# Patient Record
Sex: MALE
[De-identification: names, ages, dates, MRNs, and addresses within clinical notes are randomized per-mention and may not be internally consistent; named-entity substitution may affect disease eponyms.]

---

## 2019-03-07 PROBLEM — Z00.00 ENCOUNTER FOR PREVENTIVE HEALTH EXAMINATION: Status: ACTIVE | Noted: 2019-03-07

## 2019-03-15 ENCOUNTER — APPOINTMENT (OUTPATIENT)
Dept: GASTROENTEROLOGY | Facility: CLINIC | Age: 39
End: 2019-03-15
Payer: COMMERCIAL

## 2019-03-15 VITALS
TEMPERATURE: 98.6 F | BODY MASS INDEX: 52.48 KG/M2 | SYSTOLIC BLOOD PRESSURE: 148 MMHG | HEART RATE: 88 BPM | WEIGHT: 315 LBS | OXYGEN SATURATION: 97 % | HEIGHT: 65 IN | DIASTOLIC BLOOD PRESSURE: 98 MMHG

## 2019-03-15 PROCEDURE — 99213 OFFICE O/P EST LOW 20 MIN: CPT

## 2019-06-17 ENCOUNTER — APPOINTMENT (OUTPATIENT)
Dept: GASTROENTEROLOGY | Facility: CLINIC | Age: 39
End: 2019-06-17
Payer: COMMERCIAL

## 2019-06-17 ENCOUNTER — LABORATORY RESULT (OUTPATIENT)
Age: 39
End: 2019-06-17

## 2019-06-17 VITALS
HEART RATE: 86 BPM | WEIGHT: 160 LBS | DIASTOLIC BLOOD PRESSURE: 81 MMHG | SYSTOLIC BLOOD PRESSURE: 130 MMHG | OXYGEN SATURATION: 97 % | BODY MASS INDEX: 26.66 KG/M2 | HEIGHT: 65 IN | TEMPERATURE: 97 F

## 2019-06-17 DIAGNOSIS — Z83.3 FAMILY HISTORY OF DIABETES MELLITUS: ICD-10-CM

## 2019-06-17 DIAGNOSIS — Z78.9 OTHER SPECIFIED HEALTH STATUS: ICD-10-CM

## 2019-06-17 DIAGNOSIS — A04.8 OTHER SPECIFIED BACTERIAL INTESTINAL INFECTIONS: ICD-10-CM

## 2019-06-17 DIAGNOSIS — K64.8 OTHER HEMORRHOIDS: ICD-10-CM

## 2019-06-17 PROCEDURE — 99213 OFFICE O/P EST LOW 20 MIN: CPT

## 2019-06-17 RX ORDER — OMEPRAZOLE 40 MG/1
40 CAPSULE, DELAYED RELEASE ORAL
Refills: 0 | Status: ACTIVE | COMMUNITY

## 2019-06-17 NOTE — HISTORY OF PRESENT ILLNESS
[None] : had no significant interval events [Heartburn] : denies heartburn [Nausea] : denies nausea [Vomiting] : denies vomiting [Diarrhea] : denies diarrhea [Constipation] : denies constipation [Yellow Skin Or Eyes (Jaundice)] : denies jaundice [Abdominal Pain] : denies abdominal pain [Abdominal Swelling] : denies abdominal swelling [Rectal Pain] : denies rectal pain [Hiatus Hernia] : hiatus hernia [Wt Gain ___ Lbs] : no recent weight gain [Wt Loss ___ Lbs] : no recent weight loss [GERD] : no gastroesophageal reflux disease [Peptic Ulcer Disease] : no peptic ulcer disease [Pancreatitis] : no pancreatitis [Cholelithiasis] : no cholelithiasis [Kidney Stone] : no kidney stone [Inflammatory Bowel Disease] : no inflammatory bowel disease [Irritable Bowel Syndrome] : no irritable bowel syndrome [Diverticulitis] : no diverticulitis [Alcohol Abuse] : no alcohol abuse [Malignancy] : no malignancy [Abdominal Surgery] : no abdominal surgery [Cholecystectomy] : no cholecystectomy [Appendectomy] : no appendectomy [de-identified] : The patient denies any prior exposure to hepatitis A, B or C.  The patient denies any large doses of nonsteroidal anti-inflammatory drugs or acetaminophen.   The patient denies sharing needles, razors, nail clippers, nail files, scissors, et cetera.  The patient denies any EtOH abuse, cocaine use or intravenous drug use.   The patient denies any prior surgery, blood transfusions, sexual indiscretions, tattoos or piercing.  The patient admits to having prior surgery.   The patient denies any family history of GI or liver problems.  The patient admits to a family history of GI and liver problems.  The patient states that he is feeling uncomfortable x several months. The patient denies any jaundice or pruritus.  The patient complains of chronic left flank and left sided lower back pain.   The patient denies any abdominal pain.  The patient denies any abdominal gas and bloating.  The patient denies any nausea or vomiting.  The patient denies any gastroesophageal reflux disease or dysphagia.  The patient any atypical chest pain, shortness of breath or palpitations.  The patient denies any diaphoresis. The patient denies any constipation or diarrhea.  The patient has 1 to 2 bowel movements a day.  The patient denies a change in bowel habits.  The patient denies a change in caliber of stool.  The patient denies having mucus discharge with the bowel movements.  The patient denies any bright red blood per rectum, melena or hematemesis.  The patient denies any rectal pain or rectal pruritus.  The patient denies any weight loss or anorexia.  He denies any fevers or chills.  The upper endoscopy performed in the office on February 12, 2019 revealed small hiatal hernia and mild diffuse bile gastritis.  The pathology revealed distal esophagus with unremarkable squamous mucosa with no columnar-type mucosa identified, gastric antral and body mucosa with chronic active gastritis that was positive for Helicobacter pylori and unremarkable small bowel mucosa with no evidence of celiac disease or parasites.  The patient completed the trial of antibiotic for the H. pylori eradication.  The colonoscopy to the terminal ileum performed at the office on January 29, 2019 revealed mild patchy nonspecific erythema in the cecum and sigmoid colon, a long and tortuous colon, a poor prep colonoscopy and small internal hemorrhoids. The pathology revealed unremarkable small bowel mucosa (terminal ilium) and unremarkable colonic mucosa (cecum, sigmoid colon and rectum).  The patient tolerated the procedure well.  The CT-guided liver biopsy performed on January 31, 2014 at [facility] revealed mild macrovesicular steatosis, occasional sites with ballooning degeneration and mild necroinflammation of zone 3.  No portal inflammation was present.  The trichrome stain revealed mild perivenular and focal tiffanie-sinusoidal fibrosis.  The iron stain was negative.  The findings were consistent with steatohepatitis with mild activity (grade 1 of 3) and zone 3 tiffanie-sinusoidal fibrosis (stage 1 of 4).  The MRI of the kidneys performed on June 28, 2010 revealed a right renal lesion seen by sonography but is very likely a cyst containing debris, with an MRI equivalent of Bosniak category 2F cyst (most likely benign) anti-hepatic cysts.   The abdominal ultrasound performed on November 30, 2013 revealed echogenic liver suggestive of fatty infiltration versus intrinsic hepatocellular disease that is stable right upper pole renal angiomyolipoma. The CAT scan of the abdomen and pelvis performed on December 11, 2010 revealed previously identified benign appearing cyst on MRI in the mid pole of the right kidney but is not changed for decreased in size from previous (measuring approximately 8 mm).  It is less well evaluated on this study compared to the MRI.  The stability from previous exam is most consistent with a benign etiology.  Also noted was fatty infiltration of liver.  The blood work performed on November 25, 2013 was significant for an elevated ALT level of 75 U/L and an elevated PT/INR of 12.7/1.2.   The blood work revealed elevated liver enzymes (alkaline phosphatase/ALT of 128/75 units/L, respectively), and an elevated anti-smooth muscle antibody of 1:40 and gliadin IgA antibody of 52 units. The patient tolerated the procedures. The patient denies any significant family history of GI problems.

## 2019-06-17 NOTE — ASSESSMENT
[FreeTextEntry1] : Internal Hemorrhoids: The patient is to consider a trial of Anusol H. C. suppositories one per rectum nightly and Anusol HC2 .5% cream apply to affected area twice a day p.r.n. hemorrhoidal bleeding or pain.\par Hiatal Hernia:  The patient was advised to avoid late-night meals and dietary indiscretions.  The patient was advised to avoid fried and fatty foods.  The patient was advised to abide by an anti-GERD diet. The patient was given a pamphlet for anti-GERD.  The patient and I reviewed the anti-GERD diet at length.\par Gastritis: The patient has a history of gastritis. The patient is to avoid nonsteroidal anti-inflammatory drugs and aspirin. I recommend a trial of pantoprazole 40 mg once a day for 3 months for the symptoms.\par Fatty Liver:  The patient had an imaging study suggestive of fatty infiltration of the liver.  The patient denies any jaundice or pruritus.  The patient denies any alcohol use.  The patient denies taking large doses of nonsteroidal anti-inflammatory drugs or acetaminophen.  The findings are suggestive of fatty liver.  The patient and I had a long discussion regarding the risks of fatty liver and possible progression to cirrhosis.  The patient was told of the possible increased risk of developing liver failure, cirrhosis, ascites, GI bleeding secondary to varices, hepatic encephalopathy, bleeding tendencies and liver cancer.  The patient was told of the importance of follow-up.  The patient was advised to follow up every 6 months for blood work and imaging studies. The patient agreed and will follow up. The patient was advised to lose weight. I recommend a trial of vitamin E supplementation for the fatty liver.  If the liver enzymes remain elevated, the patient may require a trial of Pioglitazone for the fatty liver. I recommend avoid alcohol and hepato-toxic agents.  The patient was also advised to avoid NSAIDs, Acetaminophen and any other hepatotoxic drugs. The patient was also advised not to share needles, razors, scissors, nail clippers, etc.. The patient is to continue close follow-up in our office for blood work and exams.  If the liver enzymes remain elevated, the patient may require a CT guided liver biopsy to assess the liver parenchyma for possible treatment.  We had a long discussion regarding the risks and benefits of the procedure.  The patient was told of the risks of bleeding, perforation, infections, emergency surgery and missing lesions.  The patient agreed and will follow-up to reassess the symptoms.  I recommend a CBC, SMA 24, amylase, lipase, ESR, TFTs, HILARY, rheumatoid factor, celiac sprue panel, IgA, profile for hepatitis A, B, C. ,AFP, alpha 1 anti-trypsin  antibody, ceruloplasmin level, iron, TIBC, ferritin level, AMA, anti smooth muscle antibody and PT/INR/PTT.  I recommend an abdominal ultrasound of the liver to assess the liver parenchyma and for liver lesions.\par Back Pain;  The etiology of the back and left flank pain is unclear.  The patient denies any urinary tract symptoms.  He denies any polyuria, nocturia, dysuria, urinary incontinence, urinary urgency.  He denies any history of kidney stones.  I recommend an abdominal ultrasound to assess for kidney stones.  I also recommend followup with his PMD regarding the left flank and back pain. \par Blood Work: I recommend blood work to assess the patient`s symptoms. I recommend a CBC, SMA 24, amylase, lipase, ESR, TFTs, HILARY, rheumatoid factor, celiac sprue panel, IgA, profile for hepatitis A, B, C. ,AFP, alpha 1 anti-trypsin  antibody, ceruloplasmin level, iron, TIBC, ferritin level, AMA, anti smooth muscle antibody and PT/INR/PTT. \par Blood work: The patient had blood work performed by his PMD. I will try to obtain the blood work results. \par The patient had an imaging study performed by the PMD.  I will try to obtain the results of the imaging study. \par Imaging Study: I recommend an imaging study to assess the symptoms. I recommend an abdominal ultrasound to assess the liver parenchyma and for liver lesions. \par The patient had a prior poor prep colonoscopy. I recommend a repeat colonoscopy in <  3 year to reassess for colonic polyps secondary to the poor prep unless symptomatic.  The patient agreed and will follow up for the procedure. \par Follow-up:  The patient is to follow-up in the office in 3 months to reassess the symptoms.   The patient was told to call the office if any further problems.\par

## 2019-06-18 ENCOUNTER — MESSAGE (OUTPATIENT)
Age: 39
End: 2019-06-18

## 2019-06-18 LAB
A1AT SERPL-MCNC: 110 MG/DL
AFP-TM SERPL-MCNC: 7.9 NG/ML
ALBUMIN SERPL ELPH-MCNC: 4.3 G/DL
ALP BLD-CCNC: 99 U/L
ALT SERPL-CCNC: 21 U/L
AMYLASE/CREAT SERPL: 59 U/L
ANA SER IF-ACNC: NEGATIVE
ANION GAP SERPL CALC-SCNC: 11 MMOL/L
APTT BLD: 32 SEC
AST SERPL-CCNC: 17 U/L
BASOPHILS # BLD AUTO: 0.03 K/UL
BASOPHILS NFR BLD AUTO: 0.5 %
BILIRUB SERPL-MCNC: 0.4 MG/DL
BUN SERPL-MCNC: 17 MG/DL
CALCIUM SERPL-MCNC: 8.7 MG/DL
CHLORIDE SERPL-SCNC: 105 MMOL/L
CO2 SERPL-SCNC: 24 MMOL/L
CREAT SERPL-MCNC: 0.59 MG/DL
EOSINOPHIL # BLD AUTO: 0.17 K/UL
EOSINOPHIL NFR BLD AUTO: 2.6 %
ERYTHROCYTE [SEDIMENTATION RATE] IN BLOOD BY WESTERGREN METHOD: 14 MM/HR
FERRITIN SERPL-MCNC: 161 NG/ML
GGT SERPL-CCNC: 21 U/L
GLUCOSE SERPL-MCNC: 98 MG/DL
HAV IGM SER QL: NONREACTIVE
HBV CORE IGM SER QL: NONREACTIVE
HBV SURFACE AG SER QL: NONREACTIVE
HCT VFR BLD CALC: 45.3 %
HCV AB SER QL: NONREACTIVE
HCV S/CO RATIO: 0.06 S/CO
HEPATITIS A IGG ANTIBODY: NONREACTIVE
HGB BLD-MCNC: 14.3 G/DL
IGA SER QL IEP: 139 MG/DL
IMM GRANULOCYTES NFR BLD AUTO: 0.3 %
INR PPP: 1.09 RATIO
IRON SATN MFR SERPL: 34 %
IRON SERPL-MCNC: 111 UG/DL
LPL SERPL-CCNC: 21 U/L
LYMPHOCYTES # BLD AUTO: 1.35 K/UL
LYMPHOCYTES NFR BLD AUTO: 20.3 %
MAN DIFF?: NORMAL
MCHC RBC-ENTMCNC: 30 PG
MCHC RBC-ENTMCNC: 31.6 GM/DL
MCV RBC AUTO: 95 FL
MITOCHONDRIA AB SER IF-ACNC: NORMAL
MONOCYTES # BLD AUTO: 0.49 K/UL
MONOCYTES NFR BLD AUTO: 7.4 %
NEUTROPHILS # BLD AUTO: 4.59 K/UL
NEUTROPHILS NFR BLD AUTO: 68.9 %
PLATELET # BLD AUTO: 319 K/UL
POTASSIUM SERPL-SCNC: 4 MMOL/L
PROT SERPL-MCNC: 7 G/DL
PT BLD: 12.3 SEC
RBC # BLD: 4.77 M/UL
RBC # FLD: 12.9 %
RHEUMATOID FACT SER QL: <10 IU/ML
SMOOTH MUSCLE AB SER QL IF: ABNORMAL
SODIUM SERPL-SCNC: 140 MMOL/L
TIBC SERPL-MCNC: 327 UG/DL
TSH SERPL-ACNC: 2.11 UIU/ML
UIBC SERPL-MCNC: 216 UG/DL
UREA BREATH TEST QL: NEGATIVE
WBC # FLD AUTO: 6.65 K/UL

## 2019-06-19 LAB
HBV E AB SER QL: NEGATIVE
HBV E AG SER QL: NEGATIVE

## 2019-06-20 ENCOUNTER — MESSAGE (OUTPATIENT)
Age: 39
End: 2019-06-20

## 2019-06-24 LAB
CELIAC DISEASE INTERPRETATION: NORMAL
CELIAC GENE PAIRS PRESENT: NO
DQ ALPHA 1: NORMAL
DQ BETA 1: NORMAL
IMMUNOGLOBULIN A (IGA): 165 MG/DL

## 2019-07-24 ENCOUNTER — MESSAGE (OUTPATIENT)
Age: 39
End: 2019-07-24

## 2019-09-06 ENCOUNTER — APPOINTMENT (OUTPATIENT)
Dept: GASTROENTEROLOGY | Facility: CLINIC | Age: 39
End: 2019-09-06
Payer: COMMERCIAL

## 2019-09-06 VITALS
OXYGEN SATURATION: 98 % | WEIGHT: 160 LBS | TEMPERATURE: 97.5 F | SYSTOLIC BLOOD PRESSURE: 118 MMHG | BODY MASS INDEX: 26.66 KG/M2 | DIASTOLIC BLOOD PRESSURE: 77 MMHG | HEART RATE: 82 BPM | HEIGHT: 65 IN

## 2019-09-06 DIAGNOSIS — K76.0 FATTY (CHANGE OF) LIVER, NOT ELSEWHERE CLASSIFIED: ICD-10-CM

## 2019-09-06 DIAGNOSIS — K44.9 DIAPHRAGMATIC HERNIA W/OUT OBSTRUCTION OR GANGRENE: ICD-10-CM

## 2019-09-06 DIAGNOSIS — R10.12 LEFT UPPER QUADRANT PAIN: ICD-10-CM

## 2019-09-06 DIAGNOSIS — R10.9 UNSPECIFIED ABDOMINAL PAIN: ICD-10-CM

## 2019-09-06 DIAGNOSIS — K29.30 CHRONIC SUPERFICIAL GASTRITIS W/OUT BLEEDING: ICD-10-CM

## 2019-09-06 PROCEDURE — 99213 OFFICE O/P EST LOW 20 MIN: CPT

## 2019-09-06 RX ORDER — DICYCLOMINE HYDROCHLORIDE 10 MG/1
10 CAPSULE ORAL 3 TIMES DAILY
Qty: 90 | Refills: 3 | Status: ACTIVE | COMMUNITY
Start: 2019-09-06 | End: 1900-01-01

## 2019-09-06 NOTE — ASSESSMENT
[FreeTextEntry1] : Abdominal Pain: The patient complains of abdominal pain. The patient is to avoid nonsteroidal anti-inflammatory drugs and aspirin.  I recommend a low FOD-MAP diet.  I recommend a trial of Dicyclomine 10 mg tablet PO 3 times a day PRN for the abdominal pain.\par Internal Hemorrhoids: The patient is to consider a trial of Anusol H. C. suppositories one per rectum nightly and Anusol HC2.5% cream apply to affected area twice a day p.r.n. hemorrhoidal bleeding or pain.\par Hiatal Hernia: The patient was advised to avoid late-night meals and dietary indiscretions. The patient was advised to avoid fried and fatty foods. The patient was advised to abide by an anti-GERD diet. The patient was given a pamphlet for anti-GERD. The patient and I reviewed the anti-GERD diet at length.\par Gastritis: The patient has a history of gastritis. The patient is to avoid nonsteroidal anti-inflammatory drugs and aspirin. I recommend a trial of pantoprazole 40 mg once a day for 3 months for the symptoms.\par Fatty Liver: The patient had a prior imaging study suggestive of fatty infiltration of the liver. The patient denies any jaundice or pruritus. The patient denies any alcohol use. The patient denies taking large doses of nonsteroidal anti-inflammatory drugs or acetaminophen. The findings were suggestive of fatty liver. The patient and I had a long discussion regarding the risks of fatty liver and possible progression to cirrhosis. The patient was told of the possible increased risk of developing liver failure, cirrhosis, ascites, GI bleeding secondary to varices, hepatic encephalopathy, bleeding tendencies and liver cancer. The patient was told of the importance of follow-up. The patient was advised to follow up every 6 months for blood work and imaging studies. The patient agreed and will follow up. The patient was advised to lose weight. I recommend a trial of vitamin E supplementation for the fatty liver. If the liver enzymes remain elevated, the patient may require a trial of Pioglitazone for the fatty liver. I recommend avoid alcohol and hepato-toxic agents. The patient was also advised to avoid NSAIDs, Acetaminophen and any other hepatotoxic drugs. The patient was also advised not to share needles, razors, scissors, nail clippers, etc.. The patient is to continue close follow-up in our office for blood work and exams. If the liver enzymes remain elevated, the patient may require a CT guided liver biopsy to assess the liver parenchyma for possible treatment. We had a long discussion regarding the risks and benefits of the procedure. The patient was told of the risks of bleeding, perforation, infections, emergency surgery and missing lesions. The patient agreed and will follow-up to reassess the symptoms. I recommend a repeat abdominal ultrasound of the liver to assess the liver parenchyma and for liver lesions in 6 months.\par Left Flank Pain; The etiology of the left flank pain is unclear. The patient denies any urinary tract symptoms. He denies any polyuria, nocturia, dysuria, urinary incontinence, urinary urgency. He denies any history of kidney stones. I reviewed the abdominal ultrasound and CAT scan of the abdomen and pelvis that confirmed kidney stones. I also recommend followup with his PMD and urologist regarding the left flank and back pain. \par Blood work: The patient had blood work performed by his PMD. I reviewed the blood work results. \par The patient had an imaging study performed by the PMD. I reviewed the results of the imaging study (CAT scan). \par The patient had a prior poor prep colonoscopy. I recommend a repeat colonoscopy in < 3 year to reassess for colonic polyps secondary to the poor prep unless symptomatic. The patient agreed and will follow up for the procedure. \par Follow-up: The patient is to follow-up in the office in 6 months to reassess the symptoms. The patient was told to call the office if any further problems.\par

## 2019-09-06 NOTE — HISTORY OF PRESENT ILLNESS
[None] : had no significant interval events [Heartburn] : denies heartburn [Vomiting] : denies vomiting [Nausea] : denies nausea [Constipation] : denies constipation [Diarrhea] : denies diarrhea [Abdominal Swelling] : denies abdominal swelling [Yellow Skin Or Eyes (Jaundice)] : denies jaundice [Rectal Pain] : denies rectal pain [Abdominal Pain] : abdominal pain [Kidney Stone] : kidney stone [Wt Gain ___ Lbs] : no recent weight gain [Wt Loss ___ Lbs] : no recent weight loss [Hiatus Hernia] : no hiatus hernia [GERD] : no gastroesophageal reflux disease [Peptic Ulcer Disease] : no peptic ulcer disease [Pancreatitis] : no pancreatitis [Cholelithiasis] : no cholelithiasis [Inflammatory Bowel Disease] : no inflammatory bowel disease [Irritable Bowel Syndrome] : no irritable bowel syndrome [Diverticulitis] : no diverticulitis [Alcohol Abuse] : no alcohol abuse [Malignancy] : no malignancy [Abdominal Surgery] : no abdominal surgery [Appendectomy] : no appendectomy [Cholecystectomy] : no cholecystectomy [de-identified] : The patient denies any prior exposure to hepatitis A, B or C. The patient denies any large doses of nonsteroidal anti-inflammatory drugs or acetaminophen. The patient denies sharing needles, razors, nail clippers, nail files, scissors, et cetera. The patient denies any EtOH abuse, cocaine use or intravenous drug use. The patient denies any prior surgery, blood transfusions, sexual indiscretions, tattoos or piercing. The patient admits to having prior surgery. The patient denies any family history of GI or liver problems.   The patient states that he is feeling the same.  The patient denies any jaundice or pruritus.  The patient complains of occasional lower back pain. The patient complains of abdominal pain.  The patient describes the abdominal pain as a crampy, intermittent left sided abdominal discomfort that radiates to the left flank and umbilicus.  The abdominal pain is unrelated to meals or passing gas or having bowel movements.  The abdominal pain is worse with bending movement.  The abdominal pain is described as being mild in nature.  The abdominal pain occurs at night and in the morning.  The abdominal pain can occur at any time.   The abdominal pain has never awakened the patient from sleep. The abdominal pain is not relieved with medication.  The patient denies any abdominal gas and bloating.  The patient denies any nausea or vomiting.  The patient denies any gastroesophageal reflux disease or dysphagia.  The patient denies any atypical chest pain, shortness of breath or palpitations.  The patient denies any diaphoresis. The patient denies any constipation or diarrhea.  The patient has 1 to 2 bowel movements a day.   The patient denies a change in bowel habits.  The patient denies a change in caliber of stool.  The patient denies having mucus discharge with the bowel movements.  The patient denies any bright red blood per rectum, melena or hematemesis.  The patient denies any rectal pain or rectal pruritus.  The patient denies any weight loss or anorexia.  He denies any fevers or chills.   The patient had a CAT scan of the abdomen and pelvis with IV contrast performed on August 6, 2019.  The CAT scan of the abdomen and pelvis with IV contrast revealed 2 left renal calculi measuring 2 mm each and  a small umbilical hernia.  The patient was evaluated by urologist, Dr. Johnnie Mcclelland.  The patient was told to observe the renal stones at this time.  The patient had an abdominal ultrasound performed on July 23, 2019. The abdominal ultrasound revealed an unremarkable study.  The blood tests performed on June 17, 2019 was significant for an elevated anti-smooth muscle antibody of 1:20. The upper endoscopy performed in the office on February 12, 2019 revealed small hiatal hernia and mild diffuse bile gastritis. The pathology revealed distal esophagus with unremarkable squamous mucosa with no columnar-type mucosa identified, gastric antral and body mucosa with chronic active gastritis that was positive for Helicobacter pylori and unremarkable small bowel mucosa with no evidence of celiac disease or parasites. The patient completed the trial of antibiotic for the H. pylori eradication. The colonoscopy to the terminal ileum performed at the office on January 29, 2019 revealed mild patchy nonspecific erythema in the cecum and sigmoid colon, a long and tortuous colon, a poor prep colonoscopy and small internal hemorrhoids. The pathology revealed unremarkable small bowel mucosa (terminal ilium) and unremarkable colonic mucosa (cecum, sigmoid colon and rectum). The patient tolerated the procedure well. The CT-guided liver biopsy performed on January 31, 2014 at [facility] revealed mild macrovesicular steatosis, occasional sites with ballooning degeneration and mild necroinflammation of zone 3. No portal inflammation was present. The trichrome stain revealed mild perivenular and focal tiffanie-sinusoidal fibrosis. The iron stain was negative. The findings were consistent with steatohepatitis with mild activity (grade 1 of 3) and zone 3 tiffanie-sinusoidal fibrosis (stage 1 of 4). The MRI of the kidneys performed on June 28, 2010 revealed a right renal lesion seen by sonography but is very likely a cyst containing debris, with an MRI equivalent of Bosniak category 2F cyst (most likely benign) anti-hepatic cysts. The abdominal ultrasound performed on November 30, 2013 revealed echogenic liver suggestive of fatty infiltration versus intrinsic hepatocellular disease that is stable right upper pole renal angiomyolipoma. The CAT scan of the abdomen and pelvis performed on December 11, 2010 revealed previously identified benign appearing cyst on MRI in the mid pole of the right kidney but is not changed for decreased in size from previous (measuring approximately 8 mm). It is less well evaluated on this study compared to the MRI. The stability from previous exam is most consistent with a benign etiology. Also noted was fatty infiltration of liver. The blood work performed on November 25, 2013 was significant for an elevated ALT level of 75 U/L and an elevated PT/INR of 12.7/1.2. The blood work revealed elevated liver enzymes (alkaline phosphatase/ALT of 128/75 units/L, respectively), and an elevated anti-smooth muscle antibody of 1:40 and gliadin IgA antibody of 52 units. The patient tolerated the procedures. The patient denies any significant family history of GI problems.

## 2020-03-06 ENCOUNTER — APPOINTMENT (OUTPATIENT)
Dept: GASTROENTEROLOGY | Facility: CLINIC | Age: 40
End: 2020-03-06

## 2022-12-23 ENCOUNTER — INPATIENT (INPATIENT)
Facility: HOSPITAL | Age: 42
LOS: 5 days | Discharge: PSYCHIATRIC OR SUBSTANCE USE FACILITY | DRG: 880 | End: 2022-12-29
Attending: STUDENT IN AN ORGANIZED HEALTH CARE EDUCATION/TRAINING PROGRAM | Admitting: STUDENT IN AN ORGANIZED HEALTH CARE EDUCATION/TRAINING PROGRAM
Payer: COMMERCIAL

## 2022-12-23 VITALS
DIASTOLIC BLOOD PRESSURE: 89 MMHG | OXYGEN SATURATION: 99 % | RESPIRATION RATE: 18 BRPM | SYSTOLIC BLOOD PRESSURE: 151 MMHG | TEMPERATURE: 99 F | HEART RATE: 150 BPM

## 2022-12-23 DIAGNOSIS — R50.9 FEVER, UNSPECIFIED: ICD-10-CM

## 2022-12-23 DIAGNOSIS — F33.2 MAJOR DEPRESSIVE DISORDER, RECURRENT SEVERE WITHOUT PSYCHOTIC FEATURES: ICD-10-CM

## 2022-12-23 LAB
ALBUMIN SERPL ELPH-MCNC: 4.5 G/DL — SIGNIFICANT CHANGE UP (ref 3.3–5)
ALP SERPL-CCNC: 121 U/L — HIGH (ref 40–120)
ALT FLD-CCNC: 37 U/L — SIGNIFICANT CHANGE UP (ref 10–45)
AMPHET UR-MCNC: NEGATIVE — SIGNIFICANT CHANGE UP
ANION GAP SERPL CALC-SCNC: 12 MMOL/L — SIGNIFICANT CHANGE UP (ref 5–17)
APAP SERPL-MCNC: <15 UG/ML — SIGNIFICANT CHANGE UP (ref 10–30)
APPEARANCE UR: ABNORMAL
APTT BLD: 26.4 SEC — LOW (ref 27.5–35.5)
AST SERPL-CCNC: 22 U/L — SIGNIFICANT CHANGE UP (ref 10–40)
BACTERIA # UR AUTO: NEGATIVE — SIGNIFICANT CHANGE UP
BARBITURATES UR SCN-MCNC: NEGATIVE — SIGNIFICANT CHANGE UP
BASE EXCESS BLDV CALC-SCNC: -0.4 MMOL/L — SIGNIFICANT CHANGE UP (ref -2–3)
BASE EXCESS BLDV CALC-SCNC: 1.5 MMOL/L — SIGNIFICANT CHANGE UP (ref -2–3)
BASOPHILS # BLD AUTO: 0.02 K/UL — SIGNIFICANT CHANGE UP (ref 0–0.2)
BASOPHILS NFR BLD AUTO: 0.2 % — SIGNIFICANT CHANGE UP (ref 0–2)
BENZODIAZ UR-MCNC: POSITIVE
BILIRUB SERPL-MCNC: 0.3 MG/DL — SIGNIFICANT CHANGE UP (ref 0.2–1.2)
BILIRUB UR-MCNC: NEGATIVE — SIGNIFICANT CHANGE UP
BUN SERPL-MCNC: 11 MG/DL — SIGNIFICANT CHANGE UP (ref 7–23)
CA-I SERPL-SCNC: 1.11 MMOL/L — LOW (ref 1.15–1.33)
CA-I SERPL-SCNC: 1.13 MMOL/L — LOW (ref 1.15–1.33)
CALCIUM SERPL-MCNC: 9 MG/DL — SIGNIFICANT CHANGE UP (ref 8.4–10.5)
CHLORIDE BLDV-SCNC: 104 MMOL/L — SIGNIFICANT CHANGE UP (ref 96–108)
CHLORIDE BLDV-SCNC: 107 MMOL/L — SIGNIFICANT CHANGE UP (ref 96–108)
CHLORIDE SERPL-SCNC: 104 MMOL/L — SIGNIFICANT CHANGE UP (ref 96–108)
CO2 BLDV-SCNC: 26 MMOL/L — SIGNIFICANT CHANGE UP (ref 22–26)
CO2 BLDV-SCNC: 28 MMOL/L — HIGH (ref 22–26)
CO2 SERPL-SCNC: 22 MMOL/L — SIGNIFICANT CHANGE UP (ref 22–31)
COCAINE METAB.OTHER UR-MCNC: NEGATIVE — SIGNIFICANT CHANGE UP
COLOR SPEC: YELLOW — SIGNIFICANT CHANGE UP
CREAT SERPL-MCNC: 0.63 MG/DL — SIGNIFICANT CHANGE UP (ref 0.5–1.3)
DIFF PNL FLD: ABNORMAL
EGFR: 123 ML/MIN/1.73M2 — SIGNIFICANT CHANGE UP
EOSINOPHIL # BLD AUTO: 0.05 K/UL — SIGNIFICANT CHANGE UP (ref 0–0.5)
EOSINOPHIL NFR BLD AUTO: 0.5 % — SIGNIFICANT CHANGE UP (ref 0–6)
EPI CELLS # UR: 1 /HPF — SIGNIFICANT CHANGE UP
ETHANOL SERPL-MCNC: <10 MG/DL — SIGNIFICANT CHANGE UP (ref 0–10)
FLUAV AG NPH QL: SIGNIFICANT CHANGE UP
FLUBV AG NPH QL: SIGNIFICANT CHANGE UP
GAS PNL BLDV: 139 MMOL/L — SIGNIFICANT CHANGE UP (ref 136–145)
GAS PNL BLDV: SIGNIFICANT CHANGE UP
GLUCOSE BLDV-MCNC: 103 MG/DL — HIGH (ref 70–99)
GLUCOSE BLDV-MCNC: 126 MG/DL — HIGH (ref 70–99)
GLUCOSE SERPL-MCNC: 134 MG/DL — HIGH (ref 70–99)
GLUCOSE UR QL: NEGATIVE — SIGNIFICANT CHANGE UP
HCO3 BLDV-SCNC: 25 MMOL/L — SIGNIFICANT CHANGE UP (ref 22–29)
HCO3 BLDV-SCNC: 27 MMOL/L — SIGNIFICANT CHANGE UP (ref 22–29)
HCT VFR BLD CALC: 44.8 % — SIGNIFICANT CHANGE UP (ref 39–50)
HCT VFR BLDA CALC: 39 % — SIGNIFICANT CHANGE UP (ref 39–51)
HCT VFR BLDA CALC: 41 % — SIGNIFICANT CHANGE UP (ref 39–51)
HGB BLD CALC-MCNC: 13.1 G/DL — SIGNIFICANT CHANGE UP (ref 12.6–17.4)
HGB BLD CALC-MCNC: 13.5 G/DL — SIGNIFICANT CHANGE UP (ref 12.6–17.4)
HGB BLD-MCNC: 14.4 G/DL — SIGNIFICANT CHANGE UP (ref 13–17)
HYALINE CASTS # UR AUTO: 7 /LPF — HIGH (ref 0–2)
IMM GRANULOCYTES NFR BLD AUTO: 0.6 % — SIGNIFICANT CHANGE UP (ref 0–0.9)
INR BLD: 1.31 RATIO — HIGH (ref 0.88–1.16)
KETONES UR-MCNC: ABNORMAL
LACTATE BLDV-MCNC: 2.1 MMOL/L — HIGH (ref 0.5–2)
LACTATE BLDV-MCNC: 2.4 MMOL/L — HIGH (ref 0.5–2)
LEUKOCYTE ESTERASE UR-ACNC: NEGATIVE — SIGNIFICANT CHANGE UP
LYMPHOCYTES # BLD AUTO: 0.5 K/UL — LOW (ref 1–3.3)
LYMPHOCYTES # BLD AUTO: 4.5 % — LOW (ref 13–44)
MCHC RBC-ENTMCNC: 29.6 PG — SIGNIFICANT CHANGE UP (ref 27–34)
MCHC RBC-ENTMCNC: 32.1 GM/DL — SIGNIFICANT CHANGE UP (ref 32–36)
MCV RBC AUTO: 92.2 FL — SIGNIFICANT CHANGE UP (ref 80–100)
METHADONE UR-MCNC: NEGATIVE — SIGNIFICANT CHANGE UP
MONOCYTES # BLD AUTO: 0.61 K/UL — SIGNIFICANT CHANGE UP (ref 0–0.9)
MONOCYTES NFR BLD AUTO: 5.5 % — SIGNIFICANT CHANGE UP (ref 2–14)
NEUTROPHILS # BLD AUTO: 9.79 K/UL — HIGH (ref 1.8–7.4)
NEUTROPHILS NFR BLD AUTO: 88.7 % — HIGH (ref 43–77)
NITRITE UR-MCNC: NEGATIVE — SIGNIFICANT CHANGE UP
NRBC # BLD: 0 /100 WBCS — SIGNIFICANT CHANGE UP (ref 0–0)
OPIATES UR-MCNC: NEGATIVE — SIGNIFICANT CHANGE UP
OTHER CELLS CSF MANUAL: 13.8 ML/DL — LOW (ref 18–22)
OXYCODONE UR-MCNC: NEGATIVE — SIGNIFICANT CHANGE UP
PCO2 BLDV: 42 MMHG — SIGNIFICANT CHANGE UP (ref 42–55)
PCO2 BLDV: 43 MMHG — SIGNIFICANT CHANGE UP (ref 42–55)
PCP SPEC-MCNC: SIGNIFICANT CHANGE UP
PCP UR-MCNC: NEGATIVE — SIGNIFICANT CHANGE UP
PH BLDV: 7.38 — SIGNIFICANT CHANGE UP (ref 7.32–7.43)
PH BLDV: 7.4 — SIGNIFICANT CHANGE UP (ref 7.32–7.43)
PH UR: 6.5 — SIGNIFICANT CHANGE UP (ref 5–8)
PLATELET # BLD AUTO: 352 K/UL — SIGNIFICANT CHANGE UP (ref 150–400)
PO2 BLDV: 42 MMHG — SIGNIFICANT CHANGE UP (ref 25–45)
PO2 BLDV: 44 MMHG — SIGNIFICANT CHANGE UP (ref 25–45)
POTASSIUM BLDV-SCNC: 3.5 MMOL/L — SIGNIFICANT CHANGE UP (ref 3.5–5.1)
POTASSIUM BLDV-SCNC: 3.6 MMOL/L — SIGNIFICANT CHANGE UP (ref 3.5–5.1)
POTASSIUM SERPL-MCNC: 3.5 MMOL/L — SIGNIFICANT CHANGE UP (ref 3.5–5.3)
POTASSIUM SERPL-SCNC: 3.5 MMOL/L — SIGNIFICANT CHANGE UP (ref 3.5–5.3)
PROT SERPL-MCNC: 7.4 G/DL — SIGNIFICANT CHANGE UP (ref 6–8.3)
PROT UR-MCNC: ABNORMAL
PROTHROM AB SERPL-ACNC: 15.2 SEC — HIGH (ref 10.5–13.4)
RAPID RVP RESULT: SIGNIFICANT CHANGE UP
RBC # BLD: 4.86 M/UL — SIGNIFICANT CHANGE UP (ref 4.2–5.8)
RBC # FLD: 13.1 % — SIGNIFICANT CHANGE UP (ref 10.3–14.5)
RBC CASTS # UR COMP ASSIST: 5 /HPF — HIGH (ref 0–4)
RSV RNA NPH QL NAA+NON-PROBE: SIGNIFICANT CHANGE UP
SALICYLATES SERPL-MCNC: <2 MG/DL — LOW (ref 15–30)
SAO2 % BLDV: 74.2 % — SIGNIFICANT CHANGE UP (ref 67–88)
SAO2 % BLDV: 74.7 % — SIGNIFICANT CHANGE UP (ref 67–88)
SARS-COV-2 RNA SPEC QL NAA+PROBE: SIGNIFICANT CHANGE UP
SODIUM SERPL-SCNC: 138 MMOL/L — SIGNIFICANT CHANGE UP (ref 135–145)
SP GR SPEC: 1.03 — HIGH (ref 1.01–1.02)
T3 SERPL-MCNC: 113 NG/DL — SIGNIFICANT CHANGE UP (ref 80–200)
T4 AB SER-ACNC: 6.6 UG/DL — SIGNIFICANT CHANGE UP (ref 4.6–12)
THC UR QL: NEGATIVE — SIGNIFICANT CHANGE UP
TROPONIN T, HIGH SENSITIVITY RESULT: <6 NG/L — SIGNIFICANT CHANGE UP (ref 0–51)
TSH SERPL-MCNC: 1.09 UIU/ML — SIGNIFICANT CHANGE UP (ref 0.27–4.2)
UROBILINOGEN FLD QL: ABNORMAL
WBC # BLD: 11.04 K/UL — HIGH (ref 3.8–10.5)
WBC # FLD AUTO: 11.04 K/UL — HIGH (ref 3.8–10.5)
WBC UR QL: 3 /HPF — SIGNIFICANT CHANGE UP (ref 0–5)

## 2022-12-23 PROCEDURE — 93308 TTE F-UP OR LMTD: CPT | Mod: 26

## 2022-12-23 PROCEDURE — 99285 EMERGENCY DEPT VISIT HI MDM: CPT

## 2022-12-23 PROCEDURE — 93010 ELECTROCARDIOGRAM REPORT: CPT

## 2022-12-23 PROCEDURE — 71045 X-RAY EXAM CHEST 1 VIEW: CPT | Mod: 26

## 2022-12-23 RX ORDER — SODIUM CHLORIDE 9 MG/ML
1000 INJECTION, SOLUTION INTRAVENOUS ONCE
Refills: 0 | Status: COMPLETED | OUTPATIENT
Start: 2022-12-23 | End: 2022-12-23

## 2022-12-23 RX ORDER — SODIUM CHLORIDE 9 MG/ML
1000 INJECTION INTRAMUSCULAR; INTRAVENOUS; SUBCUTANEOUS ONCE
Refills: 0 | Status: COMPLETED | OUTPATIENT
Start: 2022-12-23 | End: 2022-12-23

## 2022-12-23 RX ORDER — ACETAMINOPHEN 500 MG
975 TABLET ORAL ONCE
Refills: 0 | Status: COMPLETED | OUTPATIENT
Start: 2022-12-23 | End: 2022-12-23

## 2022-12-23 RX ADMIN — Medication 1 MILLIGRAM(S): at 14:06

## 2022-12-23 RX ADMIN — Medication 975 MILLIGRAM(S): at 15:34

## 2022-12-23 RX ADMIN — SODIUM CHLORIDE 1000 MILLILITER(S): 9 INJECTION INTRAMUSCULAR; INTRAVENOUS; SUBCUTANEOUS at 11:21

## 2022-12-23 RX ADMIN — SODIUM CHLORIDE 1000 MILLILITER(S): 9 INJECTION, SOLUTION INTRAVENOUS at 15:42

## 2022-12-23 RX ADMIN — SODIUM CHLORIDE 1000 MILLILITER(S): 9 INJECTION, SOLUTION INTRAVENOUS at 13:18

## 2022-12-23 RX ADMIN — Medication 1 MILLIGRAM(S): at 11:33

## 2022-12-23 NOTE — ED BEHAVIORAL HEALTH ASSESSMENT NOTE - PSYCHIATRIC ISSUES AND PLAN (INCLUDE STANDING AND PRN MEDICATION)
Continue with meds: Venlafaxine 75mg, Clonazepam 1mg, Olanzapine 5mg at bedtime, and Lunesta 3mg at bedtime, observe 1:1

## 2022-12-23 NOTE — ED ADULT TRIAGE NOTE - CCCP TRG CHIEF CMPLNT
Found [place] pacing back and forth looking into the water - Hx depression/anxiety - denies thinking about jumping but was not found near his vehicle/depression

## 2022-12-23 NOTE — ED PROVIDER NOTE - PROGRESS NOTE DETAILS
Orion Bedolla, PGY-3- collateral from wife outside of room. no hx of alcohol use or withdrawal. per wife, pt made a comment a few weeks ago about jumping off the bridge but she didn't think he would try to act on it. Attending Jody Abreu: pt spiked a temperature. on exam pt tachycardic.,abdomen soft and nontender. pt denies any neck pain and no meningismus. no known h/o thyroid disease. will give tyelnol, initial flu negative O'Marianela DO PGY-3: received sign out on this patient. Attending Jody Abreu: HR improved to 112. pt feeling well O'Marianela DO PGY-3: will admit to hospitalist as pt is febrile w/o known source O'Marianela DO PGY-3: pt well appearing. Does not meet sepsis criteria. Possible viral source and therefore did not antibiose in ED. TBA to medicine for further follow up of cultures

## 2022-12-23 NOTE — ED PROVIDER NOTE - OBJECTIVE STATEMENT
Orion Bedolla, PGY-3- 42 year old male with a pmhx of depression, anxiety, 1 prior suicide attempt (9/2021), is brought to ED by PD for evaluation of pacing on [place]. Pt reports he was driving a car and stopped because his check tire light came on. He got out of car to fix tire and reports police showed up next. Per police, no vehicle was near pt on bridge. Pt reports he feels nervous now. Denies any attempt to kill himself. Reports no increase in sadness. Reports no active SI or plan for SI. No hallucinations, homicidal ideations. No fever, chest pain, shortness of breath, vomiting, diarrhea, trauma, thyroid disease. Denies tobacco, EtOH, or recreational drug use. NKDA. Lives at home with wife and two children. 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 Daily meds include Venlafaxine, Clonazepam, Seroquel, Lunesta.   Psych - Dr. Telly De La aPz

## 2022-12-23 NOTE — ED ADULT NURSE NOTE - OBJECTIVE STATEMENT
42y male with hx of anxiety and depression bibems for concern of suicide attempt. Pt is alert and oriented x 4 and speaking coherently, calm affect and cooperative with staff. As per EMS  called due to concern for pt looking over bridge and passerbys became concerned for pt safety. upon arrival to ER pt states that he was looking at his car tire, and states he was not attempting to harm himself. Collateral with EMS confirmed pt did not have a car this morning. pt states he has no suicidal ideations at this time, and does not have a plan to harm himself. pt states he has attempted suicide in the past by trying to put a belt around his neck. pt states he feels safe at home and denies any alcohol or drug use. pt noted to be tachycardic to the 150s, pt placed on CM EKG completed. IV placed in RAC. Pt denies feeling cp, sob, palps, n/v/d, fevers, chills. pt in nad. resting comfortably. pt on 1:1 for safety.

## 2022-12-23 NOTE — ED BEHAVIORAL HEALTH ASSESSMENT NOTE - RISK ASSESSMENT
Pt is currently denying SI/HI/AVH, however, recent history and story inconsistencies along with prior SA attempts is rather concerning for risk to self. Pt would benefit from inpatient psychiatric admission at this time.

## 2022-12-23 NOTE — ED ADULT NURSE NOTE - NS_BELOGIGINS_SECURE_ED_ALL_ED
"    ----------------------------------------------------------------------------------------------------------  [facility], [place]   Psychiatric Progress Note  Hospital Day #1     Interim History:   The patient's care was discussed with the treatment team and chart notes were reviewed.    Sleep 7 hours (07/09/21 0622)  Scheduled Medications: Compliant with scheduled medications.  PRN medications: Trazodone 50mg PO, hydroxyzine 25mg PO    Staff Report:   \"Pt isolative to room almost entire shift, laying in bed majority of shift. Pt reports trying to catch up on sleep since they had been up for several days before admission. Pt was appropriate and polite. Pt does not appear to be responding. Pt endorses depression 8/10 and anxiety 8/10. Pt denies SI/SIB thoughts. Patient is medication compliant and reported no side effects. No PRN medications given this shift.   Hygiene is adequate.   Pt is receiving double portions and ate almost all of meal.\"     Patient Interview:   Perry says that he's \"really sad and depressed\" today, feeling \"hopeless\" and \"discouraged\". He says that his sleep was \"ok\" and \"had a lot of nightmares\" that disrupted his sleep. He says that the nightmares have been occurring since he relapsed. He reports no auditory hallucinations today. He has noticed muscle cramps since yesterday. He says that his appetite has been good. He says that he has been feeling anxious and is currently anxious. He reports suicidal ideations with a plan during his relapse period. He is still interested in going to a CD treatment center. He says that his sister has been his support system and he spoke with her yesterday. He says that at their worst, his AH consist of discouraging statements. He reports lower back pain. He had not taken his medications for the 4 days prior to his admission. He also took a half dozen Percocet in addition to amphetamine use during that period. He was offered a " "mattress topper, but declined at this time.     The risks, benefits, alternatives and side effects of any medication changes have been discussed and are understood by the patient and other caregivers.    Review of systems:     ROS was negative unless noted above.          Allergies:     Allergies   Allergen Reactions     Wellbutrin [Bupropion] Anaphylaxis and Swelling     Facial swelling            Psychiatric Examination:   /73   Pulse 64   Temp 97.5  F (36.4  C)   Resp 14   Ht 1.905 m (6' 3\")   Wt 99.8 kg (220 lb)   SpO2 94%   BMI 27.50 kg/m    Weight is 220 lbs 0 oz  Body mass index is 27.5 kg/m .    MENTAL STATUS EXAM    Appearance:  awake, alert, dressed in hospital scrubs, appeared as age stated and cooperative, visible tattoos on neck,arms/forearms dorsum of hands/fingers  Attitude:  cooperative  Eye Contact:  fair  Mood:  Depressed, anxious   Affect:  appropriate and in normal range, mood congruent and intensity is normal  Speech:  clear, coherent  Psychomotor Behavior:  no evidence of tardive dyskinesia, dystonia, or tics  Thought Process:  logical, linear and goal oriented  Associations:  no loose associations  Thought Content:  thoughts of self-harm, which are decreased and auditory hallucinations present  Insight: fair  Judgment: poor  Oriented to:  time, person, and place  Attention Span and Concentration:  intact  Recent and Remote Memory: Grossly intact  Language:  english with appropriate syntax and vocabulary  Fund of Knowledge: normal, fair  Muscle Strength and Tone: not assessed  Gait and Station: Normal           Labs:   No results found for this or any previous visit (from the past 24 hour(s)).  Other tests: EKG, sinus bradycardia (42BPM) QT/QTc: 478/399   Assessment  & Plan      Assessment:     Perry Preciado is a 46 year old male with a history of schizophrenia, MDD and polysubstance abuse admitted from the [place] ED to unit 22 on 7/8/2021. Significant symptoms include AH, " paranoid delusions, SI, depressed and substance use. The patient presented to the ED 5 days after relapsing on methamphetamine and medication non-adherence with AH and paranoid delusions. Upon presentation, he was given Zyprexa and transferred to unit 22.  He has a history of multiple hospitalizations for MDD and substance abuse and his last psychiatric hospitalization was in 5/4/2020 for SI. The patient is currently prescribed Seroquel, Suboxone, Gabapentin and Celexa. Given his recent meth relapse and history of AH and depression, the most likely diagnosis is substance induced psychosis.Differential diagnoses include schizophrenia and bipolar disorder.     Given that he currently has SI and psychosis, patient warrants inpatient psychiatric hospitalization to maintain his safety. Disposition pending clinical stabilization, medication optimization and development of an appropriate discharge plan.     Risk for harm is moderate-high.  Risk factors: SI, maladaptive coping, substance use, impulsive and past behaviors  Protective factors: family and engaged in treatment      Psychiatric Hospital course:   Perry Preciado was admitted to [facility] on 7/8/21 on a 72 hour hold but signed in as a volentary patient on arrival. The patient was transferred from [facility] ED in [place] where he was given zyprexa and stabilized. Upon admission to unit 22 on 7/8, he was prescribed Abilify 5 mg/day for psychosis and Suboxone 12/12 mg and Gabapentin 600 mg PO 3x day were continued. Celexa was discontinued due to ineffectiveness. On the evening of 7/8, he required PRN Trazodone 50mg and hydroxyzine 25mg for insomnia and anxiety. On 7/9, he denied any AH so Abilify was continued at 5 mg, however he endorsed back pain so gabapentin was increased to 900 mg TID.       Principal psychiatric diagnosis:      # Substance Induced Psychosis  -Suboxone 4-1 mg per film. 3 film, sublingual BID  -CD treatment upon discharge      Secondary  diagnoses:      # Schizophrenia  - Abilify PO 5mg Daily/AM  - Gabapentin  mg TID  -Hold Celexa     PRN Medications:   - Acetaminophen 650 mg po Q4hrs PRN pain  - Mylanta 30 ml Q4H PRN for indigestion  - Hydroxyzine 25 mg Q4hrs PRN   - Olanzapine 10 mg PO/IM TID PRN severe agitation/psychosis  - Trazodone 50 mg PRN at bedtime         PRN  Medications  - Hydroxyzine 25 mg Q4hrs PRN   - Olanzapine 10 mg PO/IM TID PRN severe agitation/psychosis  - Trazodone 50 mg PRN at bedtime or Melatonin 3mg po at bedtime PRN    - Acetaminophen 650 mg po Q4hrs PRN pain     Legal Status:   Orders Placed This Encounter      Legal status Voluntary      Safety Assessment:   Behavioral Orders   Procedures     Code 1 - Restrict to Unit     Elopement precautions     Routine Programming     As clinically indicated     Self Injury Precaution     Single Room     Status 15     Every 15 minutes.     Suicide precautions     Patients on Suicide Precautions should have a Combination Diet ordered that includes a Diet selection(s) AND a Behavioral Tray selection for Safe Tray - with utensils, or Safe Tray - NO utensils       Withdrawal precautions       Disposition: Pending stabilization, plan to discharge to CD program.     Patient will be treated in therapeutic milieu with appropriate individual and group therapies as described.  The patient was seen and the plan was discussed with the attending physician.     Cristian Viera, MS3    Kulwant Kohler MD  Psychiatry PGY-1 Resident   Pager:  [phone]     Resident Attestation:   I was present with the medical student who participated in the service and in the documentation of the note. I have verified the history and personally performed the exam and medical decision making. I agree with the assessment and plan of care as documented in the note.    Attestation:  I, Pb Arciniega MD, have personally performed an examination of this patient and I have reviewed the resident's documentation.  I  have edited the note to reflect all relevant changes.  I have discussed this patient with the house staff on 7/9/2021.  I agree with resident findings and plan in today's note and yesterdays resident H&P.  I have reviewed all vitals and laboratory findings.      I certifiy that the inpatient services were ordered in accordance with the Medicare regulations governing the order. This includes certification that hospital inpatient services are reasonable and necessary and in the case of services not specified as inpatient-only under 42 .22(n), that they are appropriately provided as inpatient services in accordance with the 2-midnight benchmark under 42 .3(e).     The reason for inpatient status is Acute Psychosis.    Pb Arciniega M.D.,Ph.D.       Secured with [facility] security

## 2022-12-23 NOTE — ED BEHAVIORAL HEALTH ASSESSMENT NOTE - HPI (INCLUDE ILLNESS QUALITY, SEVERITY, DURATION, TIMING, CONTEXT, MODIFYING FACTORS, ASSOCIATED SIGNS AND SYMPTOMS)
Pt is a 41 y/o ,  male, currently domiciled with his wife, 2 children, and mother-in-law, employed as an . Pt was previously hospitalized at [facility] in September 2021 for a SA, currently seeing a psychiatrist, Dr. Telly De La Paz, on Venlafaxine 75mg, Clonazepam 1mg, Olanzapine, and Lunesta 3mg, diagnosed with MDD. No PMHx, no forensic hx, no substance use disorder hx. BIB EMS for potential suicide attempt. Per chart, the police called EMS after pt was seen by passerby looking over the [place] and became concerned for his safety. Psychiatry consulted for SI.     Pt is A&Ox4. Pt denies looking over the bridge saying that he was on the bridge because his tire light went on and he got out on the bridge to check it. Per chart, collateral with EMS confirmed pt did not have a car this morning and no car was noted by police. Pt states he has had a few changes in his psychiatric medication recently and Effexor was added 2-3 weeks ago. Pt denies current SI/HI/AVH. He endorses depression, anhedonia, lack of appetite, sleep disturbances, and anxiety.     Per pt's wife, he called her 2-3 weeks ago as he was driving over a bridge near his work, wanting to jump off of it. His wife was able to convince him not to do it and he continued to drive home. She says he continues to be depressed despite being on various medications. She reports trying everything she can to help him such as yoga, hypnotherapy, homeopathic medicine, with no improvement. She says "he used to enjoy life" but now is not interested in doing many of the things he used to enjoy.     Although pt is denying current SI and denies that being on the bridge was a SA, his recount of events is not in line with EMS or the police as he did not have a car present at the scene.

## 2022-12-23 NOTE — ED ADULT TRIAGE NOTE - CHIEF COMPLAINT QUOTE
Found [place] pacing back and forth looking into the water - Hx depression/anxiety - denies thinking about jumping but was not found near his vehicle

## 2022-12-23 NOTE — ED BEHAVIORAL HEALTH ASSESSMENT NOTE - SUMMARY
Pt is a 41 y/o ,  male, currently domiciled with his wife, 2 children, and mother-in-law, employed as an . Pt was previously hospitalized at [facility] in September 2021 for a SA, currently seeing a psychiatrist, Dr. Telly De La Paz, on Venlafaxine 75mg, Clonazepam 1mg, Olanzapine, and Lunesta 3mg, diagnosed with MDD. No PMHx, no forensic hx, no substance use disorder hx. BIB EMS for potential suicide attempt. Per chart, the police called EMS after pt was seen by passerby looking over the [place] and became concerned for his safety. Psychiatry consulted for SI.      Plan:  -Continue 1:1  -Recommend potential inpatient psychiatric admission once cleared medically

## 2022-12-23 NOTE — ED PROVIDER NOTE - CLINICAL SUMMARY MEDICAL DECISION MAKING FREE TEXT BOX
Orion Bedolla, PGY-3- 42 year old male hx of anxiety, depression, 1 prior SI attempt, here after [place] found [place]. Pt reporting he was fixing a tire though no vehicle was seen near patient on bridge. Pt tachycardic and nervous appearing on arrival. Afebrile rectally. Unclear etiology of pt's behavior. Plan for psych eval. Will work up toxic metabolic derangement, substance use, though less likely. Pt will likely need admission

## 2022-12-23 NOTE — ED PROVIDER NOTE - ATTENDING CONTRIBUTION TO CARE
Attending Statement (NINI Augustin MD):    HPI: 43y/o M with h/o depression/anxiety, 1 prior suicide attempt (9/2021), BIBEMS after found [place]; per police report no car nearby and appeared to bystanders as if he was looking to jump; per patient states he was fixing a tire (but unable to tell where car is now). Denies active SI, denies hi, ah, or vh. Denies alcohol, tobacco or drug use.    Review of Systems:  -All else negative unless otherwise specified elsewhere in this note.    PSH/PMH as noted above    On Physical Exam:  General: anxious but in NAD, speaking clearly in full sentences and without difficulty; cooperative with exam  HEENT: PERRL, MMM  Neck: no neck tenderness, no nuchal rigidity  Cardiac: tachycardic (140bpm) regular  Lungs: CTABL  Abdomen: soft nontender/nondistended  : no bladder tenderness or distension  Skin: intact, no rash  Extremities: no peripheral edema, no gross deformities  Neuro: no gross neurologic deficits; no faical asymmtry, moving all extremities equally, no reported regions of sensation loss in extremities, no rigidity or clonus    MDM: Attending Statement (NINI Augustin MD):    HPI: 41y/o M with h/o depression/anxiety, 1 prior suicide attempt (9/2021), BIBEMS after found [place]; per police report no car nearby and appeared to bystanders as if he was looking to jump; per patient states he was fixing a tire (but unable to tell where car is now). Denies active SI, denies hi, ah, or vh. Denies alcohol, tobacco or drug use.    Review of Systems:  -All else negative unless otherwise specified elsewhere in this note.    PSH/PMH as noted above    On Physical Exam:  General: anxious but in NAD, speaking clearly in full sentences and without difficulty; cooperative with exam  HEENT: PERRL, MMM  Neck: no neck tenderness, no nuchal rigidity  Cardiac: tachycardic (140bpm) regular  Lungs: CTABL  Abdomen: soft nontender/nondistended  : no bladder tenderness or distension  Skin: intact, no rash  Extremities: no peripheral edema, no gross deformities  Neuro: no gross neurologic deficits; no facial asymmetry, moving all extremities equally, no reported regions of sensation loss in extremities, no rigidity or clonus    MDM: 42M h/o depression/anxiety, presenting with suicidality / possible suicide attempt; will need psych clearance labs including serum tox; obtain psych consult for disposition.

## 2022-12-23 NOTE — ED PROVIDER NOTE - PHYSICAL EXAMINATION
General: well appearing, appears nervous, AOx3  Skin: no rash, no pallor  Head: normocephalic, atraumatic  Eyes: clear conjunctiva, EOMI, PERRL   ENMT: airway patent, no nasal discharge  Cardiovascular: tachycardic, normal rhythm, S1/S2  Pulmonary: clear to auscultation bilaterally, no rales, rhonchi, or wheeze  Abdomen: soft, nontender  Musculoskeletal: moving extremities well, no deformity  Neuro: CN II-XII grossly intact, 5/5 strength extremities, speech clear   Psych: normal mood, flat affect

## 2022-12-23 NOTE — ED BEHAVIORAL HEALTH ASSESSMENT NOTE - NSBHATTESTCOMMENTATTENDFT_PSY_A_CORE
This is a 42-y.o. HM patient, , currently domiciled with his wife, 2 children, and mother-in-law, employed as an , brought in by [facility] after found at [facility], contemplating suicide. Consult requested to evaluate for suicidality.    I have seen and evaluated this patient myself. Chart, labs, meds reviewed. I agree with trainee's assessment and plan. Patient is not a safe discharge and warrants further inpatient evaluation and care when medically stable. 2PC furnished.

## 2022-12-24 ENCOUNTER — TRANSCRIPTION ENCOUNTER (OUTPATIENT)
Age: 42
End: 2022-12-24

## 2022-12-24 DIAGNOSIS — Z29.9 ENCOUNTER FOR PROPHYLACTIC MEASURES, UNSPECIFIED: ICD-10-CM

## 2022-12-24 DIAGNOSIS — R00.0 TACHYCARDIA, UNSPECIFIED: ICD-10-CM

## 2022-12-24 DIAGNOSIS — R65.10 SYSTEMIC INFLAMMATORY RESPONSE SYNDROME (SIRS) OF NON-INFECTIOUS ORIGIN WITHOUT ACUTE ORGAN DYSFUNCTION: ICD-10-CM

## 2022-12-24 LAB
ALBUMIN SERPL ELPH-MCNC: 4.2 G/DL — SIGNIFICANT CHANGE UP (ref 3.3–5)
ALP SERPL-CCNC: 108 U/L — SIGNIFICANT CHANGE UP (ref 40–120)
ALT FLD-CCNC: 40 U/L — SIGNIFICANT CHANGE UP (ref 10–45)
ANION GAP SERPL CALC-SCNC: 12 MMOL/L — SIGNIFICANT CHANGE UP (ref 5–17)
AST SERPL-CCNC: 24 U/L — SIGNIFICANT CHANGE UP (ref 10–40)
BILIRUB SERPL-MCNC: 0.2 MG/DL — SIGNIFICANT CHANGE UP (ref 0.2–1.2)
BUN SERPL-MCNC: 7 MG/DL — SIGNIFICANT CHANGE UP (ref 7–23)
CALCIUM SERPL-MCNC: 9.2 MG/DL — SIGNIFICANT CHANGE UP (ref 8.4–10.5)
CHLORIDE SERPL-SCNC: 106 MMOL/L — SIGNIFICANT CHANGE UP (ref 96–108)
CO2 SERPL-SCNC: 24 MMOL/L — SIGNIFICANT CHANGE UP (ref 22–31)
CREAT SERPL-MCNC: 0.57 MG/DL — SIGNIFICANT CHANGE UP (ref 0.5–1.3)
EGFR: 126 ML/MIN/1.73M2 — SIGNIFICANT CHANGE UP
GLUCOSE SERPL-MCNC: 120 MG/DL — HIGH (ref 70–99)
HCT VFR BLD CALC: 43.2 % — SIGNIFICANT CHANGE UP (ref 39–50)
HGB BLD-MCNC: 13.8 G/DL — SIGNIFICANT CHANGE UP (ref 13–17)
LACTATE SERPL-SCNC: 1.6 MMOL/L — SIGNIFICANT CHANGE UP (ref 0.5–2)
MAGNESIUM SERPL-MCNC: 2.1 MG/DL — SIGNIFICANT CHANGE UP (ref 1.6–2.6)
MCHC RBC-ENTMCNC: 29.9 PG — SIGNIFICANT CHANGE UP (ref 27–34)
MCHC RBC-ENTMCNC: 31.9 GM/DL — LOW (ref 32–36)
MCV RBC AUTO: 93.5 FL — SIGNIFICANT CHANGE UP (ref 80–100)
NRBC # BLD: 0 /100 WBCS — SIGNIFICANT CHANGE UP (ref 0–0)
PHOSPHATE SERPL-MCNC: 2.2 MG/DL — LOW (ref 2.5–4.5)
PLATELET # BLD AUTO: 320 K/UL — SIGNIFICANT CHANGE UP (ref 150–400)
POTASSIUM SERPL-MCNC: 3.9 MMOL/L — SIGNIFICANT CHANGE UP (ref 3.5–5.3)
POTASSIUM SERPL-SCNC: 3.9 MMOL/L — SIGNIFICANT CHANGE UP (ref 3.5–5.3)
PROT SERPL-MCNC: 7.2 G/DL — SIGNIFICANT CHANGE UP (ref 6–8.3)
RBC # BLD: 4.62 M/UL — SIGNIFICANT CHANGE UP (ref 4.2–5.8)
RBC # FLD: 13.2 % — SIGNIFICANT CHANGE UP (ref 10.3–14.5)
SODIUM SERPL-SCNC: 142 MMOL/L — SIGNIFICANT CHANGE UP (ref 135–145)
WBC # BLD: 6.65 K/UL — SIGNIFICANT CHANGE UP (ref 3.8–10.5)
WBC # FLD AUTO: 6.65 K/UL — SIGNIFICANT CHANGE UP (ref 3.8–10.5)

## 2022-12-24 PROCEDURE — 99223 1ST HOSP IP/OBS HIGH 75: CPT

## 2022-12-24 PROCEDURE — 12345: CPT | Mod: NC,GC

## 2022-12-24 PROCEDURE — 93010 ELECTROCARDIOGRAM REPORT: CPT

## 2022-12-24 PROCEDURE — 99232 SBSQ HOSP IP/OBS MODERATE 35: CPT

## 2022-12-24 RX ORDER — ZOLPIDEM TARTRATE 10 MG/1
5 TABLET ORAL AT BEDTIME
Refills: 0 | Status: DISCONTINUED | OUTPATIENT
Start: 2022-12-24 | End: 2022-12-29

## 2022-12-24 RX ORDER — VENLAFAXINE HCL 75 MG
112.5 CAPSULE, EXT RELEASE 24 HR ORAL DAILY
Refills: 0 | Status: DISCONTINUED | OUTPATIENT
Start: 2022-12-24 | End: 2022-12-26

## 2022-12-24 RX ORDER — VENLAFAXINE HCL 75 MG
75 CAPSULE, EXT RELEASE 24 HR ORAL DAILY
Refills: 0 | Status: DISCONTINUED | OUTPATIENT
Start: 2022-12-24 | End: 2022-12-24

## 2022-12-24 RX ORDER — ENOXAPARIN SODIUM 100 MG/ML
40 INJECTION SUBCUTANEOUS EVERY 24 HOURS
Refills: 0 | Status: DISCONTINUED | OUTPATIENT
Start: 2022-12-24 | End: 2022-12-28

## 2022-12-24 RX ORDER — OLANZAPINE 15 MG/1
5 TABLET, FILM COATED ORAL AT BEDTIME
Refills: 0 | Status: DISCONTINUED | OUTPATIENT
Start: 2022-12-24 | End: 2022-12-29

## 2022-12-24 RX ORDER — CLONAZEPAM 1 MG
1 TABLET ORAL THREE TIMES A DAY
Refills: 0 | Status: DISCONTINUED | OUTPATIENT
Start: 2022-12-24 | End: 2022-12-25

## 2022-12-24 RX ORDER — CHLORHEXIDINE GLUCONATE 213 G/1000ML
1 SOLUTION TOPICAL DAILY
Refills: 0 | Status: DISCONTINUED | OUTPATIENT
Start: 2022-12-24 | End: 2022-12-29

## 2022-12-24 RX ORDER — OLANZAPINE 15 MG/1
5 TABLET, FILM COATED ORAL
Refills: 0 | Status: DISCONTINUED | OUTPATIENT
Start: 2022-12-24 | End: 2022-12-24

## 2022-12-24 RX ORDER — LANOLIN ALCOHOL/MO/W.PET/CERES
3 CREAM (GRAM) TOPICAL AT BEDTIME
Refills: 0 | Status: DISCONTINUED | OUTPATIENT
Start: 2022-12-24 | End: 2022-12-29

## 2022-12-24 RX ADMIN — Medication 3 MILLIGRAM(S): at 22:34

## 2022-12-24 RX ADMIN — ENOXAPARIN SODIUM 40 MILLIGRAM(S): 100 INJECTION SUBCUTANEOUS at 12:37

## 2022-12-24 RX ADMIN — Medication 1 MILLIGRAM(S): at 20:39

## 2022-12-24 RX ADMIN — OLANZAPINE 5 MILLIGRAM(S): 15 TABLET, FILM COATED ORAL at 22:34

## 2022-12-24 RX ADMIN — CHLORHEXIDINE GLUCONATE 1 APPLICATION(S): 213 SOLUTION TOPICAL at 12:32

## 2022-12-24 RX ADMIN — OLANZAPINE 5 MILLIGRAM(S): 15 TABLET, FILM COATED ORAL at 05:41

## 2022-12-24 RX ADMIN — Medication 112.5 MILLIGRAM(S): at 12:32

## 2022-12-24 RX ADMIN — Medication 1 MILLIGRAM(S): at 10:21

## 2022-12-24 NOTE — PATIENT PROFILE ADULT - FALL HARM RISK - HARM RISK INTERVENTIONS

## 2022-12-24 NOTE — PATIENT PROFILE ADULT - FALL HARM RISK - PATIENT NEEDS ASSISTANCE
Subjective   Adali Horne is a 61 y.o. female.     Chief Complaint   Patient presents with   • Abdominal Pain   • Back Pain   • Fatigue       History of Present Illness   Complains of low back pain on both sides for the last 2 weeks with pain in the left lower abdomen and left hip.  Has numbness in the left thigh on the sides and not the back.  Increased pain with sitting, standing and difficulty in laying down to get in comfortable position.  Seems to fee better with taking pressure of the back.    The following portions of the patient's history were reviewed and updated as appropriate: allergies, current medications, past family history, past medical history, past social history, past surgical history and problem list.    Depression Screen:  PHQ-2/PHQ-9 Depression Screening 2021   Retired PHQ-9 Total Score 9   Retired Total Score 9       Past Medical History:   Diagnosis Date   • Achilles tendinitis    • Acute foot pain, left    • Acute frontal sinusitis    • Arthralgia of multiple sites    • Calcaneal spur    • Edema    • Fatigue    • GERD (gastroesophageal reflux disease)    • Hyperglycemia    • New onset of headaches    • Tendonitis    • Varicose vein of leg        Past Surgical History:   Procedure Laterality Date   • APPENDECTOMY     •  SECTION     • COLONOSCOPY  2019    normal   • GALLBLADDER SURGERY     • HYSTERECTOMY      has left ovary       Family History   Problem Relation Age of Onset   • Prostate cancer Father    • Hypertension Mother    • Hyperlipidemia Mother    • Heart attack Brother 57        stents x2   • Prostate cancer Brother        Social History     Socioeconomic History   • Marital status:    Tobacco Use   • Smoking status: Former Smoker     Types: Cigarettes     Quit date:      Years since quittin.4   • Smokeless tobacco: Never Used   • Tobacco comment: smoked socially for few years   Substance and Sexual Activity   • Alcohol use: No   • Drug use: No    • Sexual activity: Defer       Current Outpatient Medications   Medication Sig Dispense Refill   • albuterol sulfate  (90 Base) MCG/ACT inhaler Inhale 2 puffs Every 4 (Four) Hours As Needed for Wheezing or Shortness of Air. 18 g 0   • estradiol (ESTRACE) 1 MG tablet Take 1 mg by mouth Daily.     • loratadine (CLARITIN) 10 MG tablet Take 10 mg by mouth Daily.     • Melatonin 5 MG chewable tablet Chew As Needed.     • omeprazole (priLOSEC) 20 MG capsule TAKE 1 CAPSULE BY MOUTH DAILY 90 capsule 1   • methylPREDNISolone (MEDROL) 4 MG dose pack Take as directed on package instructions. 21 tablet 0     No current facility-administered medications for this visit.       Review of Systems   Constitutional: Negative for activity change, appetite change, fatigue, fever, unexpected weight gain and unexpected weight loss.   HENT: Negative for nosebleeds, rhinorrhea, trouble swallowing and voice change.    Eyes: Negative for visual disturbance.   Respiratory: Negative for cough, chest tightness, shortness of breath and wheezing.    Cardiovascular: Negative for chest pain, palpitations and leg swelling.   Gastrointestinal: Negative for abdominal pain, blood in stool, constipation, diarrhea, nausea, vomiting, GERD and indigestion.   Genitourinary: Negative for dysuria, frequency and hematuria.   Musculoskeletal: Positive for back pain. Negative for arthralgias and myalgias.        Left lower quadrant abdominal discomfort.  Left hip pain.   Skin: Negative for rash and wound.   Neurological: Positive for numbness. Negative for dizziness, tremors, weakness, light-headedness, headache and memory problem.   Hematological: Negative for adenopathy. Does not bruise/bleed easily.   Psychiatric/Behavioral: Negative for sleep disturbance and depressed mood. The patient is not nervous/anxious.        Objective   /84 (BP Location: Left arm, Patient Position: Sitting, Cuff Size: Large Adult)   Pulse 81   Temp 98.6 °F (37 °C)  "(Temporal)   Ht 157.5 cm (62.01\")   Wt 83.5 kg (184 lb)   SpO2 98%   BMI 33.65 kg/m²     Physical Exam  Vitals and nursing note reviewed.   Constitutional:       General: She is not in acute distress.     Appearance: She is well-developed. She is not diaphoretic.   HENT:      Head: Normocephalic and atraumatic.      Right Ear: External ear normal.      Left Ear: External ear normal.      Nose: Nose normal.   Eyes:      Conjunctiva/sclera: Conjunctivae normal.      Pupils: Pupils are equal, round, and reactive to light.   Neck:      Thyroid: No thyromegaly.      Trachea: No tracheal deviation.   Cardiovascular:      Rate and Rhythm: Normal rate and regular rhythm.      Heart sounds: Normal heart sounds. No murmur heard.    No friction rub. No gallop.   Pulmonary:      Effort: Pulmonary effort is normal. No respiratory distress.      Breath sounds: Normal breath sounds.   Abdominal:      General: Bowel sounds are normal.      Palpations: Abdomen is soft. There is no mass.      Tenderness: There is no abdominal tenderness. There is no guarding or rebound.   Musculoskeletal:      Cervical back: Normal range of motion and neck supple.      Comments: Patient with pain on left leg straight leg raise.  Was also noted to have some tenderness on the low lumbar region.  No overlying erythema or rashes were noted.  No pain with right leg straight leg raise.   Lymphadenopathy:      Cervical: No cervical adenopathy.   Skin:     General: Skin is warm and dry.      Capillary Refill: Capillary refill takes less than 2 seconds.      Findings: No rash.   Neurological:      Mental Status: She is alert and oriented to person, place, and time.      Motor: No abnormal muscle tone.      Deep Tendon Reflexes: Reflexes normal.   Psychiatric:         Behavior: Behavior normal.         Thought Content: Thought content normal.         Judgment: Judgment normal.         Recent Results (from the past 2016 hour(s))   POCT urinalysis dipstick, " automated    Collection Time: 05/19/22  3:58 PM    Specimen: Urine   Result Value Ref Range    Color Yellow Yellow, Straw, Dark Yellow, [place]    Clarity, UA Clear Clear    Specific Gravity  1.015 1.005 - 1.030    pH, Urine 6.5 5.0 - 8.0    Leukocytes Negative Negative    Nitrite, UA Negative Negative    Protein, POC Negative Negative mg/dL    Glucose, UA Negative Negative, 1000 mg/dL (3+) mg/dL    Ketones, UA Negative Negative    Urobilinogen, UA Normal Normal    Bilirubin Negative Negative    Blood, UA Negative Negative    Lot Number [id number]     Expiration Date 01/08/2023      Assessment & Plan   Diagnoses and all orders for this visit:    1. Acute bilateral low back pain with left-sided sciatica (Primary)  -     methylPREDNISolone (MEDROL) 4 MG dose pack; Take as directed on package instructions.  Dispense: 21 tablet; Refill: 0    2. Lower abdominal pain  -     POCT urinalysis dipstick, automated    3. Gastroesophageal reflux disease    Low back pain with left-sided sciatica by exam.  We will treat with a steroid pack.  Observe.  Given instructions on proper physical therapy activities at home.  Offered physical therapy follow-up with patient declines this at this time.  Continue with her omeprazole over-the-counter.  If she has persisting issues or worsening then may consider imaging and pain management.           · COVID-19 Precautions - Patient was compliant in wearing a mask. When I saw the patient, I used appropriate personal protective equipment (PPE) including mask and eye shield (standard procedure).  Additionally, I used gown and gloves if indicated.  Hand hygiene was completed before and after seeing the patient.  · Dictated utilizing Dragon Dictation      No assistance needed

## 2022-12-24 NOTE — DISCHARGE NOTE PROVIDER - NSFOLLOWUPCLINICS_GEN_ALL_ED_FT
[facility] [place]  Internal Medicine  [address] [place] [address]  Phone: [phone]  Fax: [phone]  Follow Up Time: 2 weeks    [facility]  Psychiatry  [address] [place] [address]  Phone: [phone]  Fax:   Follow Up Time: 2 weeks

## 2022-12-24 NOTE — H&P ADULT - NSHPSOCIALHISTORY_GEN_ALL_CORE
Social History:    Marital Status: (x  ) , (  ) Single, (  ) , (  ) , (  )   # of Children: 2  Lives with: (  ) alone, ( x ) children, (x  ) spouse, (  ) parents, (  ) siblings, (  ) friends, (  ) other:   Occupation:     Substance Use/Illicit Drugs: (  ) never used vs other:   Tobacco Usage: ( x ) never smoked, (  ) former smoker, (  ) current smoker and Total Pack-Years:   Last Alcohol Usage/Frequency/Amount/Withdrawal/Hx of Abuse:  none  Foreign travel:   Animal exposure:

## 2022-12-24 NOTE — PROGRESS NOTE ADULT - SUBJECTIVE AND OBJECTIVE BOX
PROGRESS NOTE:   Authored by Adalid Goodrich MD   Patient is a 42y old  Male who presents with a chief complaint of walking on bridge (24 Dec 2022 03:05)      SUBJECTIVE / OVERNIGHT EVENTS:  No acute events overnight.       ADDITIONAL REVIEW OF SYSTEMS:    MEDICATIONS  (STANDING):  OLANZapine 5 milliGRAM(s) Oral two times a day  venlafaxine XR. 75 milliGRAM(s) Oral daily    MEDICATIONS  (PRN):  clonazePAM  Tablet 1 milliGRAM(s) Oral three times a day PRN anxiety  zolpidem 5 milliGRAM(s) Oral at bedtime PRN Insomnia  zolpidem 5 milliGRAM(s) Oral at bedtime PRN Insomnia      CAPILLARY BLOOD GLUCOSE        I&O's Summary      PHYSICAL EXAM:  Vital Signs Last 24 Hrs  T(C): 37.4 (24 Dec 2022 05:16), Max: 38.5 (23 Dec 2022 14:40)  T(F): 99.3 (24 Dec 2022 05:16), Max: 101.3 (23 Dec 2022 14:40)  HR: 96 (24 Dec 2022 05:16) (91 - 150)  BP: 124/82 (24 Dec 2022 05:16) (118/81 - 151/89)  BP(mean): --  RR: 18 (24 Dec 2022 05:16) (16 - 20)  SpO2: 97% (24 Dec 2022 05:16) (96% - 100%)    Parameters below as of 24 Dec 2022 05:16  Patient On (Oxygen Delivery Method): room air        GENERAL: No apparent distress.   HEAD:  Atraumatic, Normocephalic  EYES: EOMI, PERRLA, conjunctiva and sclera clear  NECK: Supple, no lymphadenopathy, no elevated JVP  CHEST/LUNG: Clear to auscultation bilateral and symmetric; No wheezes, rales, or rhonchi  HEART: S1 and S2 normal. Regular rate and rhythm; No murmurs, rubs, or gallops  ABDOMEN: Soft, non-tender, non-distended; normal bowel sounds  EXTREMITIES:  2+ peripheral pulses b/l, No clubbing, cyanosis, or edema  NEUROLOGY: A&O x 3, no focal deficits  SKIN: No rashes or lesions    LABS:                        14.4   11.04 )-----------( 352      ( 23 Dec 2022 11:18 )             44.8         138  |  104  |  11  ----------------------------<  134<H>  3.5   |  22  |  0.63    Ca    9.0      23 Dec 2022 11:18    TPro  7.4  /  Alb  4.5  /  TBili  0.3  /  DBili  x   /  AST  22  /  ALT  37  /  AlkPhos  121<H>  12    PT/INR - ( 23 Dec 2022 18:59 )   PT: 15.2 sec;   INR: 1.31 ratio         PTT - ( 23 Dec 2022 18:59 )  PTT:26.4 sec  CARDIAC MARKERS ( 23 Dec 2022 11:18 )  x     / x     / 117 U/L / x     / x          Urinalysis Basic - ( 23 Dec 2022 11:20 )    Color: Yellow / Appearance: Slightly Turbid / S.027 / pH: x  Gluc: x / Ketone: Small  / Bili: Negative / Urobili: 3 mg/dL   Blood: x / Protein: 30 mg/dL / Nitrite: Negative   Leuk Esterase: Negative / RBC: 5 /hpf / WBC 3 /HPF   Sq Epi: x / Non Sq Epi: 1 /hpf / Bacteria: Negative          RADIOLOGY & ADDITIONAL TESTS:  Lab Results Reviewed   Imaging Reviewed  Electrocardiogram Reviewed   PROGRESS NOTE:   Authored by Adalid Goodrich MD   Patient is a 42y old  Male who presents with a chief complaint of walking on bridge (24 Dec 2022 03:05)      SUBJECTIVE / OVERNIGHT EVENTS:  No acute events overnight.   No chest pain, dyspnea, abdominal pain, nausea, vomiting or diarrhea.  Reports 1 loose stool this AM.  No cough, sore throat, recent viral illness.    Reports being on bridge because his tire needed to be fixed, per police no car nearby.     ADDITIONAL REVIEW OF SYSTEMS:    MEDICATIONS  (STANDING):  OLANZapine 5 milliGRAM(s) Oral two times a day  venlafaxine XR. 75 milliGRAM(s) Oral daily    MEDICATIONS  (PRN):  clonazePAM  Tablet 1 milliGRAM(s) Oral three times a day PRN anxiety  zolpidem 5 milliGRAM(s) Oral at bedtime PRN Insomnia  zolpidem 5 milliGRAM(s) Oral at bedtime PRN Insomnia      CAPILLARY BLOOD GLUCOSE        I&O's Summary      PHYSICAL EXAM:  Vital Signs Last 24 Hrs  T(C): 37.4 (24 Dec 2022 05:16), Max: 38.5 (23 Dec 2022 14:40)  T(F): 99.3 (24 Dec 2022 05:16), Max: 101.3 (23 Dec 2022 14:40)  HR: 96 (24 Dec 2022 05:16) (91 - 150)  BP: 124/82 (24 Dec 2022 05:16) (118/81 - 151/89)  BP(mean): --  RR: 18 (24 Dec 2022 05:16) (16 - 20)  SpO2: 97% (24 Dec 2022 05:16) (96% - 100%)    Parameters below as of 24 Dec 2022 05:16  Patient On (Oxygen Delivery Method): room air        GENERAL: No apparent distress.   HEAD:  Atraumatic, Normocephalic  EYES: EOMI, PERRLA, conjunctiva and sclera clear  NECK: Supple, no lymphadenopathy, no elevated JVP  CHEST/LUNG: Clear to auscultation bilateral and symmetric; No wheezes, rales, or rhonchi  HEART: S1 and S2 normal. Regular rate and rhythm; No murmurs, rubs, or gallops  ABDOMEN: Soft, non-tender, non-distended; normal bowel sounds  EXTREMITIES:  2+ peripheral pulses b/l, No clubbing, cyanosis, or edema  NEUROLOGY: A&O x 3, no focal deficits  SKIN: No rashes or lesions  Psych: Affect appropriate,  no suicidal ideation.  No AH/VH  Insight: Poor  Judgement: Poor      LABS:                        14.4   11.04 )-----------( 352      ( 23 Dec 2022 11:18 )             44.8         138  |  104  |  11  ----------------------------<  134<H>  3.5   |  22  |  0.63    Ca    9.0      23 Dec 2022 11:18    TPro  7.4  /  Alb  4.5  /  TBili  0.3  /  DBili  x   /  AST  22  /  ALT  37  /  AlkPhos  121<H>      PT/INR - ( 23 Dec 2022 18:59 )   PT: 15.2 sec;   INR: 1.31 ratio         PTT - ( 23 Dec 2022 18:59 )  PTT:26.4 sec  CARDIAC MARKERS ( 23 Dec 2022 11:18 )  x     / x     / 117 U/L / x     / x          Urinalysis Basic - ( 23 Dec 2022 11:20 )    Color: Yellow / Appearance: Slightly Turbid / S.027 / pH: x  Gluc: x / Ketone: Small  / Bili: Negative / Urobili: 3 mg/dL   Blood: x / Protein: 30 mg/dL / Nitrite: Negative   Leuk Esterase: Negative / RBC: 5 /hpf / WBC 3 /HPF   Sq Epi: x / Non Sq Epi: 1 /hpf / Bacteria: Negative          RADIOLOGY & ADDITIONAL TESTS:  Lab Results Reviewed   Imaging Reviewed  Electrocardiogram Reviewed   PROGRESS NOTE:   Authored by Adalid Goodrich MD   Patient is a 42y old  Male who presents with a chief complaint of walking on bridge (24 Dec 2022 03:05)      SUBJECTIVE / OVERNIGHT EVENTS:  No acute events overnight.   No chest pain, dyspnea, abdominal pain, nausea, vomiting or diarrhea.  Reports 1 loose stool this AM.  No cough, sore throat, recent viral illness.    Reports being on bridge because his tire needed to be fixed, per police no car nearby.     Wife updated at bedside.    ADDITIONAL REVIEW OF SYSTEMS:    MEDICATIONS  (STANDING):  OLANZapine 5 milliGRAM(s) Oral two times a day  venlafaxine XR. 75 milliGRAM(s) Oral daily    MEDICATIONS  (PRN):  clonazePAM  Tablet 1 milliGRAM(s) Oral three times a day PRN anxiety  zolpidem 5 milliGRAM(s) Oral at bedtime PRN Insomnia  zolpidem 5 milliGRAM(s) Oral at bedtime PRN Insomnia      CAPILLARY BLOOD GLUCOSE        I&O's Summary      PHYSICAL EXAM:  Vital Signs Last 24 Hrs  T(C): 37.4 (24 Dec 2022 05:16), Max: 38.5 (23 Dec 2022 14:40)  T(F): 99.3 (24 Dec 2022 05:16), Max: 101.3 (23 Dec 2022 14:40)  HR: 96 (24 Dec 2022 05:16) (91 - 150)  BP: 124/82 (24 Dec 2022 05:16) (118/81 - 151/89)  BP(mean): --  RR: 18 (24 Dec 2022 05:16) (16 - 20)  SpO2: 97% (24 Dec 2022 05:16) (96% - 100%)    Parameters below as of 24 Dec 2022 05:16  Patient On (Oxygen Delivery Method): room air        GENERAL: No apparent distress.   HEAD:  Atraumatic, Normocephalic  EYES: EOMI, PERRLA, conjunctiva and sclera clear  NECK: Supple, no lymphadenopathy, no elevated JVP  CHEST/LUNG: Clear to auscultation bilateral and symmetric; No wheezes, rales, or rhonchi  HEART: S1 and S2 normal. Regular rate and rhythm; No murmurs, rubs, or gallops  ABDOMEN: Soft, non-tender, non-distended; normal bowel sounds  EXTREMITIES:  2+ peripheral pulses b/l, No clubbing, cyanosis, or edema  NEUROLOGY: A&O x 3, no focal deficits  SKIN: No rashes or lesions  Psych: Affect appropriate,  no suicidal ideation.  No AH/VH  Insight: Poor  Judgement: Poor      LABS:                        14.4   11.04 )-----------( 352      ( 23 Dec 2022 11:18 )             44.8         138  |  104  |  11  ----------------------------<  134<H>  3.5   |  22  |  0.63    Ca    9.0      23 Dec 2022 11:18    TPro  7.4  /  Alb  4.5  /  TBili  0.3  /  DBili  x   /  AST  22  /  ALT  37  /  AlkPhos  121<H>  12-23    PT/INR - ( 23 Dec 2022 18:59 )   PT: 15.2 sec;   INR: 1.31 ratio         PTT - ( 23 Dec 2022 18:59 )  PTT:26.4 sec  CARDIAC MARKERS ( 23 Dec 2022 11:18 )  x     / x     / 117 U/L / x     / x          Urinalysis Basic - ( 23 Dec 2022 11:20 )    Color: Yellow / Appearance: Slightly Turbid / S.027 / pH: x  Gluc: x / Ketone: Small  / Bili: Negative / Urobili: 3 mg/dL   Blood: x / Protein: 30 mg/dL / Nitrite: Negative   Leuk Esterase: Negative / RBC: 5 /hpf / WBC 3 /HPF   Sq Epi: x / Non Sq Epi: 1 /hpf / Bacteria: Negative          RADIOLOGY & ADDITIONAL TESTS:  Lab Results Reviewed   Imaging Reviewed  Electrocardiogram Reviewed

## 2022-12-24 NOTE — BH CONSULTATION LIAISON PROGRESS NOTE - NSBHCONSULTMEDPRNREASON_PSY_A_CORE
anxiety... Bactrim Pregnancy And Lactation Text: This medication is Pregnancy Category D and is known to cause fetal risk.  It is also excreted in breast milk.

## 2022-12-24 NOTE — BH CONSULTATION LIAISON PROGRESS NOTE - NSBHFUPINTERVALHXFT_PSY_A_CORE
Patient seen and evaluated, staff consulted, chart, labs, meds reviewed. Reports no issues overnight. States there are no changes in mood, energy, appetite, and sleep. Denies active SI/HI at this time, evasive about hopelessness, helplessness, guilt. Patient also complains of anxiety and overall feeling of uneasiness and being unwell. He appears sick. His HR is still around 115.    Direction of care discussed with the patient and his wife. Counseling and support provided.  Patient did not represent a management problem overnight.

## 2022-12-24 NOTE — DISCHARGE NOTE PROVIDER - HOSPITAL COURSE
42 year old man with anxiety, depression, suicidal ideation (September 2021) who is here for suspected suicidal ideation. [place] and has threatened to wife to jump off and states he has thoughts of jumping off bridge. Admitted to medicine for SIRS criteria with fever to 101.3 and sinus tachycardia to 150s. Patient was seen by psychiatry who recommended ... regimen and inpatient psychiatry admission. Chest X-ray done negative, UA negative, blood cultures pending. Patient monitored off antibiotics and afebrile.    42 year old man with anxiety, depression, suicidal ideation (September 2021) who is here for suspected suicidal ideation. [place] and has threatened to wife to jump off and states he has thoughts of jumping off bridge. Admitted to medicine for SIRS criteria with fever to 101.3 and sinus tachycardia to 150s. Chest X-ray negative, UA negative, Bcx NGTD. ECG done showing sinus tachycardia with RBBB. Patient complaining of loose stools and fever and elevated heart rate likely 2/2 acute gastroenteritis given + sapovirus on GI PCR. CT Head negative. Patient was monitored off antibiotics, afebrile and heart rate improved. Patient seen by psychiatry who recommended CT head and adjustment of home medications. Patient persistently anxious during stay. Patient to be admitted to inpatient psychiatric hospital for management of suicidal ideation with evidence of plan. 2PC completed for admission. Patient to follow-up with Dr. Anthony, patient's psychiatrist 42 year old man with anxiety, depression, suicidal ideation (September 2021) who is here for suspected suicidal ideation. [place] and has threatened to wife to jump off and states he has thoughts of jumping off bridge. Admitted to medicine for SIRS criteria with fever to 101.3 and sinus tachycardia to 150s. Chest X-ray negative, UA negative, Bcx NGTD. ECG done showing sinus tachycardia with RBBB. Patient complaining of loose stools and fever and elevated heart rate likely 2/2 acute gastroenteritis given + sapovirus on GI PCR. CT Head negative. Patient was monitored off antibiotics, afebrile and heart rate improved. Patient seen by psychiatry who recommended CT head and adjustment of home medications. Patient persistently anxious during stay. Patient to be admitted to inpatient psychiatric hospital for management of suicidal ideation with evidence of plan. 2PC completed for admission. Patient to follow-up with Dr. Anthony, patient's psychiatrist    medically stable for discharge to inpatient psych unit

## 2022-12-24 NOTE — DISCHARGE NOTE PROVIDER - NSDCCPTREATMENT_GEN_ALL_CORE_FT
PRINCIPAL PROCEDURE  Procedure: CT head wo con  Findings and Treatment:   INTERPRETATION:  .  CLINICAL INFORMATION: Suicidal ideation. Evaluate for anatomical cause.  TECHNIQUE: Multiple axial CT images of the head were obtained without   contrast. Sagittal and coronal reconstructed images were acquired from   the source data.  COMPARISON: No prior CT studies of the brain are available for comparison   at this institution.  FINDINGS: There is no acute intracranial hemorrhage, mass effect, shift   of the midline structures, herniation, extra-axial fluid collection, or   hydrocephalus.  The paranasal sinuses and mastoid air cells are clear. The orbits are   unremarkable. The calvarium is intact.  IMPRESSION: No acute intracranial hemorrhage, mass effect, or shift of   the midline structures.  --- End of Report ---

## 2022-12-24 NOTE — H&P ADULT - NSHPREVIEWOFSYSTEMS_GEN_ALL_CORE
REVIEW OF SYSTEMS:  CONSTITUTIONAL: No weakness. No fevers. No chills. No rigors. No poor appetite.  EYES: No blurry or double vision. No eye pain.  ENT: No hearing difficulty. No sore throat. No Sinusitis/rhinorrhea.   NECK: No pain. No stiffness/rigidity.  CARDIAC: No chest pain. No palpitations. No lightheadedness. No syncope.  RESPIRATORY: No cough. No SOB.   GASTROINTESTINAL: No abdominal pain. No nausea. No vomiting. No diarrhea. No constipation.   GENITOURINARY: No dysuria. No frequency. No oliguria.  NEUROLOGICAL: No numbness/tingling. No focal weakness. No headache. No unsteady gait.  BACK: No back pain. No flank pain.  EXTREMITIES: No lower extremity edema. Full ROM. No joint pain.  SKIN: No rashes. No itching. No other lesions.  PSYCHIATRIC: +depression. +anxiety.   ALLERGIC: No lip swelling. No hives.  All other review of systems is negative unless indicated above.  Unless indicated above, unable to assess ROS 2/2

## 2022-12-24 NOTE — H&P ADULT - PROBLEM SELECTOR PLAN 1
- seen by psych  - psych in their note wrote pt is on olanzapine 5mg, but surescripts shows pt is on olanzapine 15mg. pt does not know what dose he takes. will start olanzapine 5mg bid for now.  - cont other home meds  - cont 1:1 - seen by psych  - psych in their note wrote pt is on olanzapine 5mg, but surescripts shows pt is on olanzapine 15mg. pt does not know what dose he takes. will start olanzapine 5mg bid for now.  - cont other home meds  - cont 1:1  - wife wants pt to get treated, but is hesitant about him being inpt psych with people who have "more serious" psych disorders.

## 2022-12-24 NOTE — H&P ADULT - NSHPLABSRESULTS_GEN_ALL_CORE
Personally reviewed old records.  Personally reviewed labs.  Personally reviewed imaging.                        14.4   11.04 )-----------( 352      ( 23 Dec 2022 11:18 )             44.8           138  |  104  |  11  ----------------------------<  134<H>  3.5   |  22  |  0.63    Ca    9.0      23 Dec 2022 11:18    TPro  7.4  /  Alb  4.5  /  TBili  0.3  /  DBili  x   /  AST  22  /  ALT  37  /  AlkPhos  121<H>  12      CARDIAC MARKERS ( 23 Dec 2022 11:18 )  x     / x     / 117 U/L / x     / x            LIVER FUNCTIONS - ( 23 Dec 2022 11:18 )  Alb: 4.5 g/dL / Pro: 7.4 g/dL / ALK PHOS: 121 U/L / ALT: 37 U/L / AST: 22 U/L / GGT: x             PT/INR - ( 23 Dec 2022 18:59 )   PT: 15.2 sec;   INR: 1.31 ratio         PTT - ( 23 Dec 2022 18:59 )  PTT:26.4 sec    Urinalysis Basic - ( 23 Dec 2022 11:20 )    Color: Yellow / Appearance: Slightly Turbid / S.027 / pH: x  Gluc: x / Ketone: Small  / Bili: Negative / Urobili: 3 mg/dL   Blood: x / Protein: 30 mg/dL / Nitrite: Negative   Leuk Esterase: Negative / RBC: 5 /hpf / WBC 3 /HPF   Sq Epi: x / Non Sq Epi: 1 /hpf / Bacteria: Negative

## 2022-12-24 NOTE — PROGRESS NOTE ADULT - PROBLEM SELECTOR PLAN 1
- seen by psych  - psych in their note wrote pt is on olanzapine 5mg, but surescripts shows pt is on olanzapine 15mg. pt does not know what dose he takes. will start olanzapine 5mg bid for now.  - cont other home meds  - cont 1:1  - wife wants pt to get treated, but is hesitant about him being inpt psych with people who have "more serious" psych disorders. - seen by psych  - f/u psych   - cont other home meds  - cont 1:1  - wife wants pt to get treated, but is hesitant about him being inpt psych with people who have "more serious" psych disorders.  - Psych recommending

## 2022-12-24 NOTE — PATIENT PROFILE ADULT - NSPROPTRIGHTSUPPORTNAME_GEN_A_NUR
Patient VS:      99.6 axillary temp     68 HR     129/68 BP     47 RR     92% O2 sat    Patient lethargic, not responding to questions or commands. alex

## 2022-12-24 NOTE — DISCHARGE NOTE PROVIDER - NSDCMRMEDTOKEN_GEN_ALL_CORE_FT
Effexor XR 75 mg oral capsule, extended release: 1 cap(s) orally once a day  KlonoPIN 1 mg oral tablet: 1 tab(s) orally 3 times a day, As Needed  Lunesta 3 mg oral tablet: 1 tab(s) orally once a day (at bedtime)  OLANZapine 15 mg oral tablet: 1 tab(s) orally once a day   clonazePAM 1 mg oral tablet: 1 tab(s) orally 3 times a day  OLANZapine 5 mg oral tablet: 1 tab(s) orally once a day (at bedtime)  venlafaxine 150 mg oral capsule, extended release: 1 cap(s) orally once a day  zolpidem 5 mg oral tablet: 1 tab(s) orally once a day (at bedtime), As needed, Insomnia   clonazePAM 1 mg oral tablet: 1 tab(s) orally 3 times a day  melatonin 3 mg oral tablet: 1 tab(s) orally once a day (at bedtime)  OLANZapine 5 mg oral tablet: 1 tab(s) orally once a day (at bedtime)  venlafaxine 150 mg oral capsule, extended release: 1 cap(s) orally once a day  zolpidem 5 mg oral tablet: 1 tab(s) orally once a day (at bedtime), As needed, Insomnia

## 2022-12-24 NOTE — DISCHARGE NOTE PROVIDER - NSDCCPCAREPLAN_GEN_ALL_CORE_FT
PRINCIPAL DISCHARGE DIAGNOSIS  Diagnosis: Acute gastroenteritis  Assessment and Plan of Treatment: You came into the hospital for suicidal ideation/ plan to commit suicide. You were found to have elevated heart rate and fever. These findings were concerning for infection. We culture your blood which was negative. We did an xray of your chest which was negative for pneumonia. We tested your urine which was negative for infection. Given your loose stools we were concerned for infection in your gastrointestinal tract. We tested your stool and it was positive for sapovirus. Sapovirus can cause your loose stools and lead to your fever and elevated heart rate. We treated you with supportive care. We monitored you off antibiotics and you were afebrile and your fast heart rate resolved.  Please return to the hospital if you experience fever, chills, severe headache, dizziness, lightheadedness, loss of consciousness, visual disturbance, chest pain, palpitations, shortness of breath,  abdominal pain, nausea, vomiting, diarrhea, blood in your vomit/stool/urine, burning with urination or change in urinary pattern, numbness, weakness, tingling, or other alarming symptoms.      SECONDARY DISCHARGE DIAGNOSES  Diagnosis: Suicidal ideation  Assessment and Plan of Treatment: You came into the hospital for concern for suicidal ideation. [place] police with concern you were going to jump off that bridge. You claimed to have been fixing your car however no car/tire was reported nearby where you were found. Given your past history of thoughts about jumping off bridges, and psychiatric history we were concerned that you were planning to commit suicide. You were seen by psychiatry who had you on continuous observation, adjusted your medication, and recommended a CT of your head. CT of your head was negative for any acute intracranial pathology. They recommended you be admitted to an inpatient psychiatric facility. You will be continuing your care there and be psychiatrically optimized at that facility.  If you have thoughts of harming yourself or plan to harm yourself, please return to the emergency department for care.       PRINCIPAL DISCHARGE DIAGNOSIS  Diagnosis: Acute gastroenteritis  Assessment and Plan of Treatment: You came into the hospital for suicidal ideation/ plan to commit suicide. You were found to have elevated heart rate and fever. These findings were concerning for infection. We culture your blood which was negative. We did an xray of your chest which was negative for pneumonia. We tested your urine which was negative for infection. Given your loose stools we were concerned for infection in your gastrointestinal tract. We tested your stool and it was positive for sapovirus. Sapovirus can cause your loose stools and lead to your fever and elevated heart rate. We treated you with supportive care. We monitored you off antibiotics and you were afebrile and your fast heart rate resolved.  Please return to the hospital if you experience fever, chills, severe headache, dizziness, lightheadedness, loss of consciousness, visual disturbance, chest pain, palpitations, shortness of breath,  abdominal pain, nausea, vomiting, diarrhea, blood in your vomit/stool/urine, burning with urination or change in urinary pattern, numbness, weakness, tingling, or other alarming symptoms.      SECONDARY DISCHARGE DIAGNOSES  Diagnosis: Suicidal ideation  Assessment and Plan of Treatment: You came into the hospital for concern for suicidal ideation. You [place] bridge by police with concern you were going to jump off that bridge. You claimed to have been fixing your car however no car/tire was reported nearby where you were found. Given your past history of thoughts about jumping off bridges, and psychiatric history we were concerned that you were planning to commit suicide. You were seen by psychiatry who had you on continuous observation, adjusted your medication, and recommended a CT of your head. CT of your head was negative for any acute intracranial pathology. They recommended you be admitted to an inpatient psychiatric facility. You will be continuing your care there and be psychiatrically optimized at that facility.  If you have thoughts of harming yourself or plan to harm yourself, please return to the emergency department for care.      Diagnosis: Severe episode of recurrent major depressive disorder, without psychotic features  Assessment and Plan of Treatment:

## 2022-12-24 NOTE — PROGRESS NOTE ADULT - PROBLEM SELECTOR PLAN 3
DVT:  Diet:  Dispo: likely 2/2 anxiety hx of anxiety and possible suicide attempt, PE unlikely given saturating well on RA, no evidence of hyperthyroidism, patient not in pain, afebrile.  Patient tachycardic to 115 per psychiatry  - f/u repeat ECG  - TSH wnl

## 2022-12-24 NOTE — H&P ADULT - NSHPPHYSICALEXAM_GEN_ALL_CORE
PHYSICAL EXAM:   GENERAL: Alert. Not confused. No acute distress. Not thin. Not cachectic. Not obese.  HEAD:  Atraumatic. Normocephalic.  EYES: EOMI. PERRLA. Normal conjunctiva/sclera.  ENT: Neck supple. No JVD. Moist oral mucosa. Not edentulous. No thrush.  LYMPH: Normal supraclavicular/cervical lymph nodes.   CARDIAC: Not tachy, Not ricki. Regular rhythm. Not irregularly irregular. S1. S2. No murmur. No rub. No distant heart sounds.  LUNG/CHEST: CTAB. BS equal bilaterally. No wheezes. No rales. No rhonchi.  ABDOMEN: Soft. No tenderness. No distension. No fluid wave. Normal bowel sounds.  BACK: No midline/vertebral tenderness. No flank tenderness.  VASCULAR: +2 b/l radial or ulnar pulses. Palpable DP pulses.  EXTREMITIES:  No clubbing. No cyanosis. No edema. Moving all 4.  NEUROLOGY: A&Ox3. Non-focal exam. Cranial nerves intact. Normal speech. Sensation intact.  PSYCH: Normal behavior. Flat affect.  SKIN: No jaundice. No erythema. No rash/lesion.  ICU Vital Signs Last 24 Hrs  T(C): 36.7 (24 Dec 2022 02:16), Max: 38.5 (23 Dec 2022 14:40)  T(F): 98.1 (24 Dec 2022 02:16), Max: 101.3 (23 Dec 2022 14:40)  HR: 96 (24 Dec 2022 02:16) (96 - 150)  BP: 120/84 (24 Dec 2022 02:16) (120/84 - 151/89)  BP(mean): --  ABP: --  ABP(mean): --  RR: 18 (24 Dec 2022 02:16) (16 - 20)  SpO2: 97% (24 Dec 2022 02:16) (96% - 100%)    O2 Parameters below as of 24 Dec 2022 02:16  Patient On (Oxygen Delivery Method): room air          I&O's Summary

## 2022-12-25 DIAGNOSIS — R19.7 DIARRHEA, UNSPECIFIED: ICD-10-CM

## 2022-12-25 LAB
ANION GAP SERPL CALC-SCNC: 17 MMOL/L — SIGNIFICANT CHANGE UP (ref 5–17)
BUN SERPL-MCNC: 11 MG/DL — SIGNIFICANT CHANGE UP (ref 7–23)
CALCIUM SERPL-MCNC: 9.4 MG/DL — SIGNIFICANT CHANGE UP (ref 8.4–10.5)
CHLORIDE SERPL-SCNC: 102 MMOL/L — SIGNIFICANT CHANGE UP (ref 96–108)
CO2 SERPL-SCNC: 20 MMOL/L — LOW (ref 22–31)
CREAT SERPL-MCNC: 0.55 MG/DL — SIGNIFICANT CHANGE UP (ref 0.5–1.3)
EGFR: 128 ML/MIN/1.73M2 — SIGNIFICANT CHANGE UP
GI PCR PANEL: DETECTED
GLUCOSE SERPL-MCNC: 88 MG/DL — SIGNIFICANT CHANGE UP (ref 70–99)
HCT VFR BLD CALC: 46.1 % — SIGNIFICANT CHANGE UP (ref 39–50)
HGB BLD-MCNC: 14.6 G/DL — SIGNIFICANT CHANGE UP (ref 13–17)
MAGNESIUM SERPL-MCNC: 2.1 MG/DL — SIGNIFICANT CHANGE UP (ref 1.6–2.6)
MCHC RBC-ENTMCNC: 29.7 PG — SIGNIFICANT CHANGE UP (ref 27–34)
MCHC RBC-ENTMCNC: 31.7 GM/DL — LOW (ref 32–36)
MCV RBC AUTO: 93.9 FL — SIGNIFICANT CHANGE UP (ref 80–100)
MRSA PCR RESULT.: SIGNIFICANT CHANGE UP
NRBC # BLD: 0 /100 WBCS — SIGNIFICANT CHANGE UP (ref 0–0)
PHOSPHATE SERPL-MCNC: 3.8 MG/DL — SIGNIFICANT CHANGE UP (ref 2.5–4.5)
PLATELET # BLD AUTO: 339 K/UL — SIGNIFICANT CHANGE UP (ref 150–400)
POTASSIUM SERPL-MCNC: 3.7 MMOL/L — SIGNIFICANT CHANGE UP (ref 3.5–5.3)
POTASSIUM SERPL-SCNC: 3.7 MMOL/L — SIGNIFICANT CHANGE UP (ref 3.5–5.3)
RBC # BLD: 4.91 M/UL — SIGNIFICANT CHANGE UP (ref 4.2–5.8)
RBC # FLD: 12.7 % — SIGNIFICANT CHANGE UP (ref 10.3–14.5)
S AUREUS DNA NOSE QL NAA+PROBE: DETECTED
SAPOVIRUS (GENOGROUPS I, II, IV, AND V): DETECTED
SODIUM SERPL-SCNC: 139 MMOL/L — SIGNIFICANT CHANGE UP (ref 135–145)
WBC # BLD: 8.96 K/UL — SIGNIFICANT CHANGE UP (ref 3.8–10.5)
WBC # FLD AUTO: 8.96 K/UL — SIGNIFICANT CHANGE UP (ref 3.8–10.5)

## 2022-12-25 PROCEDURE — 99232 SBSQ HOSP IP/OBS MODERATE 35: CPT | Mod: GC

## 2022-12-25 PROCEDURE — 99232 SBSQ HOSP IP/OBS MODERATE 35: CPT

## 2022-12-25 RX ORDER — ACETAMINOPHEN 500 MG
650 TABLET ORAL EVERY 6 HOURS
Refills: 0 | Status: DISCONTINUED | OUTPATIENT
Start: 2022-12-25 | End: 2022-12-29

## 2022-12-25 RX ORDER — CLONAZEPAM 1 MG
1 TABLET ORAL THREE TIMES A DAY
Refills: 0 | Status: DISCONTINUED | OUTPATIENT
Start: 2022-12-25 | End: 2022-12-29

## 2022-12-25 RX ORDER — LIDOCAINE 4 G/100G
1 CREAM TOPICAL ONCE
Refills: 0 | Status: COMPLETED | OUTPATIENT
Start: 2022-12-25 | End: 2022-12-25

## 2022-12-25 RX ADMIN — OLANZAPINE 5 MILLIGRAM(S): 15 TABLET, FILM COATED ORAL at 23:03

## 2022-12-25 RX ADMIN — CHLORHEXIDINE GLUCONATE 1 APPLICATION(S): 213 SOLUTION TOPICAL at 11:36

## 2022-12-25 RX ADMIN — Medication 112.5 MILLIGRAM(S): at 11:36

## 2022-12-25 RX ADMIN — LIDOCAINE 1 PATCH: 4 CREAM TOPICAL at 11:32

## 2022-12-25 RX ADMIN — Medication 1 MILLIGRAM(S): at 11:38

## 2022-12-25 RX ADMIN — Medication 650 MILLIGRAM(S): at 12:20

## 2022-12-25 RX ADMIN — Medication 3 MILLIGRAM(S): at 23:03

## 2022-12-25 RX ADMIN — ENOXAPARIN SODIUM 40 MILLIGRAM(S): 100 INJECTION SUBCUTANEOUS at 11:40

## 2022-12-25 RX ADMIN — Medication 1 MILLIGRAM(S): at 23:03

## 2022-12-25 RX ADMIN — Medication 650 MILLIGRAM(S): at 11:35

## 2022-12-25 RX ADMIN — Medication 1 MILLIGRAM(S): at 15:02

## 2022-12-25 NOTE — BH CONSULTATION LIAISON PROGRESS NOTE - NSBHFUPINTERVALHXFT_PSY_A_CORE
Pt seen with wife at bedside, c/o increased anxiety today, inquiring about when he will get Klonopin, states he takes it bid-tid regularly at home (currently ordered as PRN).   sleep is better since coming to the hospital, but he could go many days without sleep previously.  Denies SI/HI presently, [place] and feels "there is a mixup" when told there are reports that a car was not present.      Wife  pt had told her a couple weeks before admission that he was having SI to go off the bridge on his normal drive to work; she stayed on video-chat with him and checked in with him during the day to make sure he is safe; states the rest of the day proceeded without incident.   pt has been depressed with insomnia, anxious, stressed in the weeks leading to admission, lonely after several of his friends at work left their jobs.   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   pt is concerned about having experienced a 2 month psychiatric admission previously, felt like he was incarcerated.   pt follows regularly with his OP psychiatrist and therapist.

## 2022-12-25 NOTE — PROGRESS NOTE ADULT - PROBLEM SELECTOR PLAN 1
- seen by psych  - f/u psych   - cont other home meds  - cont 1:1  - wife wants pt to get treated, but is hesitant about him being inpt psych with people who have "more serious" psych disorders.  - Psych recommending

## 2022-12-25 NOTE — PROGRESS NOTE ADULT - PROBLEM SELECTOR PLAN 4
DVT: lovenox 40 mg SQ QD  Diet: Regular  Dispo: Psych inpatient likely 2/2 anxiety hx of anxiety and possible suicide attempt, PE unlikely given saturating well on RA, no evidence of hyperthyroidism, patient not in pain, afebrile.  Patient tachycardic to 115 per psychiatry  - f/u repeat ECG  - TSH wnl

## 2022-12-25 NOTE — PROGRESS NOTE ADULT - PROBLEM SELECTOR PLAN 3
likely 2/2 anxiety hx of anxiety and possible suicide attempt, PE unlikely given saturating well on RA, no evidence of hyperthyroidism, patient not in pain, afebrile.  Patient tachycardic to 115 per psychiatry  - f/u repeat ECG  - TSH wnl febrile to 101.3, tachy to 150  - f/u blood cultures  - no localizing s/s  - monitor off abx  - chest xray negative, rvp negative, ua negative.   - No rash, no abscess noted.

## 2022-12-25 NOTE — PROGRESS NOTE ADULT - SUBJECTIVE AND OBJECTIVE BOX
PROGRESS NOTE:   Patient is a 42y old  Male who presents with a chief complaint of [place] (24 Dec 2022 03:05)      SUBJECTIVE / OVERNIGHT EVENTS:  Pt had diarrhea ON. GI PCR sent.   No chest pain, dyspnea, abdominal pain, nausea, vomiting  No cough, sore throat, recent viral illness.    Reports being [place] because his [place] needed to be fixed, per police no car nearby.         ADDITIONAL REVIEW OF SYSTEMS:    MEDICATIONS  (STANDING):  OLANZapine 5 milliGRAM(s) Oral two times a day  venlafaxine XR. 75 milliGRAM(s) Oral daily    MEDICATIONS  (PRN):  clonazePAM  Tablet 1 milliGRAM(s) Oral three times a day PRN anxiety  zolpidem 5 milliGRAM(s) Oral at bedtime PRN Insomnia  zolpidem 5 milliGRAM(s) Oral at bedtime PRN Insomnia      CAPILLARY BLOOD GLUCOSE        I&O's Summary      PHYSICAL EXAM:  Vital Signs Last 24 Hrs  T(C): 37.4 (24 Dec 2022 05:16), Max: 38.5 (23 Dec 2022 14:40)  T(F): 99.3 (24 Dec 2022 05:16), Max: 101.3 (23 Dec 2022 14:40)  HR: 96 (24 Dec 2022 05:16) (91 - 150)  BP: 124/82 (24 Dec 2022 05:16) (118/81 - 151/89)  BP(mean): --  RR: 18 (24 Dec 2022 05:16) (16 - 20)  SpO2: 97% (24 Dec 2022 05:16) (96% - 100%)    Parameters below as of 24 Dec 2022 05:16  Patient On (Oxygen Delivery Method): room air        GENERAL: No apparent distress.   HEAD:  Atraumatic, Normocephalic  EYES: EOMI, PERRLA, conjunctiva and sclera clear  NECK: Supple, no lymphadenopathy, no elevated JVP  CHEST/LUNG: Clear to auscultation bilateral and symmetric; No wheezes, rales, or rhonchi  HEART: S1 and S2 normal. Regular rate and rhythm; No murmurs, rubs, or gallops  ABDOMEN: Soft, non-tender, non-distended; normal bowel sounds  EXTREMITIES:  2+ peripheral pulses b/l, No clubbing, cyanosis, or edema  NEUROLOGY: A&O x 3, no focal deficits  SKIN: No rashes or lesions  Psych: Affect appropriate,  no suicidal ideation.  No AH/VH  Insight: Poor  Judgement: Poor      LABS:                        14.4   11.04 )-----------( 352      ( 23 Dec 2022 11:18 )             44.8         138  |  104  |  11  ----------------------------<  134<H>  3.5   |  22  |  0.63    Ca    9.0      23 Dec 2022 11:18    TPro  7.4  /  Alb  4.5  /  TBili  0.3  /  DBili  x   /  AST  22  /  ALT  37  /  AlkPhos  121<H>      PT/INR - ( 23 Dec 2022 18:59 )   PT: 15.2 sec;   INR: 1.31 ratio         PTT - ( 23 Dec 2022 18:59 )  PTT:26.4 sec  CARDIAC MARKERS ( 23 Dec 2022 11:18 )  x     / x     / 117 U/L / x     / x          Urinalysis Basic - ( 23 Dec 2022 11:20 )    Color: Yellow / Appearance: Slightly Turbid / S.027 / pH: x  Gluc: x / Ketone: Small  / Bili: Negative / Urobili: 3 mg/dL   Blood: x / Protein: 30 mg/dL / Nitrite: Negative   Leuk Esterase: Negative / RBC: 5 /hpf / WBC 3 /HPF   Sq Epi: x / Non Sq Epi: 1 /hpf / Bacteria: Negative          RADIOLOGY & ADDITIONAL TESTS:  Lab Results Reviewed   Imaging Reviewed  Electrocardiogram Reviewed   PROGRESS NOTE:   Patient is a 42y old  Male who presents with a chief complaint of [place] (24 Dec 2022 03:05)      SUBJECTIVE / OVERNIGHT EVENTS:  Pt had diarrhea ON. GI PCR sent. This AM, pt says diarrhea has stopped.   No chest pain, dyspnea, abdominal pain, nausea, vomiting  This AM, no SI/HI, mood is good but pt anxious regarding discharge plan.        ADDITIONAL REVIEW OF SYSTEMS:    MEDICATIONS  (STANDING):  OLANZapine 5 milliGRAM(s) Oral two times a day  venlafaxine XR. 75 milliGRAM(s) Oral daily    MEDICATIONS  (PRN):  clonazePAM  Tablet 1 milliGRAM(s) Oral three times a day PRN anxiety  zolpidem 5 milliGRAM(s) Oral at bedtime PRN Insomnia  zolpidem 5 milliGRAM(s) Oral at bedtime PRN Insomnia      CAPILLARY BLOOD GLUCOSE        I&O's Summary      PHYSICAL EXAM:  Vital Signs Last 24 Hrs  T(C): 37.4 (24 Dec 2022 05:16), Max: 38.5 (23 Dec 2022 14:40)  T(F): 99.3 (24 Dec 2022 05:16), Max: 101.3 (23 Dec 2022 14:40)  HR: 96 (24 Dec 2022 05:16) (91 - 150)  BP: 124/82 (24 Dec 2022 05:16) (118/81 - 151/89)  BP(mean): --  RR: 18 (24 Dec 2022 05:16) (16 - 20)  SpO2: 97% (24 Dec 2022 05:16) (96% - 100%)    Parameters below as of 24 Dec 2022 05:16  Patient On (Oxygen Delivery Method): room air        GENERAL: No apparent distress.   HEAD:  Atraumatic, Normocephalic  EYES: EOMI, PERRLA, conjunctiva and sclera clear  NECK: Supple, no lymphadenopathy, no elevated JVP  CHEST/LUNG: Clear to auscultation bilateral and symmetric; No wheezes, rales, or rhonchi  HEART: S1 and S2 normal. Regular rate and rhythm; No murmurs, rubs, or gallops  ABDOMEN: Soft, non-tender, non-distended; normal bowel sounds  EXTREMITIES:  2+ peripheral pulses b/l, No clubbing, cyanosis, or edema  NEUROLOGY: A&O x 3, no focal deficits  SKIN: No rashes or lesions  Psych: Affect appropriate,  no suicidal ideation.  No AH/  Insight: Poor  Judgement: Poor      LABS:  LABS:                        14.6   8.96  )-----------( 339      ( 25 Dec 2022 07:10 )             46.1     12-25    139  |  102  |  11  ----------------------------<  88  3.7   |  20<L>  |  0.55    Ca    9.4      25 Dec 2022 07:08  Phos  3.8     12-25  Mg     2.1     12-25    TPro  7.2  /  Alb  4.2  /  TBili  0.2  /  DBili  x   /  AST  24  /  ALT  40  /  AlkPhos  108  12-24    PT/INR - ( 23 Dec 2022 18:59 )   PT: 15.2 sec;   INR: 1.31 ratio         PTT - ( 23 Dec 2022 18:59 )  PTT:26.4 sec          Culture - Blood (collected 23 Dec 2022 15:40)  Source: .Blood Blood  Preliminary Report (24 Dec 2022 19:01):    No growth to date.    Culture - Blood (collected 23 Dec 2022 15:20)  Source: .Blood Blood  Preliminary Report (24 Dec 2022 19:01):    No growth to date.

## 2022-12-25 NOTE — PROGRESS NOTE ADULT - PROBLEM SELECTOR PLAN 2
febrile to 101.3, tachy to 150  - f/u blood cultures  - no localizing s/s  - monitor off abx  - chest xray negative, rvp negative, ua negative.   - No rash, no abscess noted. GI PCR with Sapovirus, course typically self-limiting  Improved  Will monitor and treat symptomatically

## 2022-12-26 PROCEDURE — 99232 SBSQ HOSP IP/OBS MODERATE 35: CPT

## 2022-12-26 PROCEDURE — 99232 SBSQ HOSP IP/OBS MODERATE 35: CPT | Mod: GC

## 2022-12-26 RX ORDER — VENLAFAXINE HCL 75 MG
150 CAPSULE, EXT RELEASE 24 HR ORAL DAILY
Refills: 0 | Status: DISCONTINUED | OUTPATIENT
Start: 2022-12-27 | End: 2022-12-29

## 2022-12-26 RX ADMIN — CHLORHEXIDINE GLUCONATE 1 APPLICATION(S): 213 SOLUTION TOPICAL at 12:20

## 2022-12-26 RX ADMIN — Medication 1 MILLIGRAM(S): at 21:30

## 2022-12-26 RX ADMIN — Medication 1 MILLIGRAM(S): at 05:17

## 2022-12-26 RX ADMIN — Medication 3 MILLIGRAM(S): at 21:30

## 2022-12-26 RX ADMIN — Medication 112.5 MILLIGRAM(S): at 12:21

## 2022-12-26 RX ADMIN — Medication 1 MILLIGRAM(S): at 13:21

## 2022-12-26 RX ADMIN — OLANZAPINE 5 MILLIGRAM(S): 15 TABLET, FILM COATED ORAL at 21:30

## 2022-12-26 RX ADMIN — ENOXAPARIN SODIUM 40 MILLIGRAM(S): 100 INJECTION SUBCUTANEOUS at 12:21

## 2022-12-26 NOTE — PROGRESS NOTE ADULT - SUBJECTIVE AND OBJECTIVE BOX
PROGRESS NOTE:   Authored by Adalid Goodrich MD   Patient is a 42y old  Male who presents with a chief complaint of [place] (25 Dec 2022 08:00)      SUBJECTIVE / OVERNIGHT EVENTS:  No acute events overnight.   No chest pain,  palpitations, dyspnea, abdominal pain, nausea, vomiting, diarrhea, blurry vision, dysuria, lightheadedness, dizziness.    ADDITIONAL REVIEW OF SYSTEMS:    MEDICATIONS  (STANDING):  chlorhexidine 4% Liquid 1 Application(s) Topical daily  clonazePAM  Tablet 1 milliGRAM(s) Oral three times a day  enoxaparin Injectable 40 milliGRAM(s) SubCutaneous every 24 hours  melatonin 3 milliGRAM(s) Oral at bedtime  OLANZapine 5 milliGRAM(s) Oral at bedtime  venlafaxine XR. 112.5 milliGRAM(s) Oral daily    MEDICATIONS  (PRN):  acetaminophen     Tablet .. 650 milliGRAM(s) Oral every 6 hours PRN Temp greater or equal to 38C (100.4F), Moderate Pain (4 - 6), Severe Pain (7 - 10)  zolpidem 5 milliGRAM(s) Oral at bedtime PRN Insomnia  zolpidem 5 milliGRAM(s) Oral at bedtime PRN Insomnia      CAPILLARY BLOOD GLUCOSE        I&O's Summary    25 Dec 2022 07:01  -  26 Dec 2022 07:00  --------------------------------------------------------  IN: 240 mL / OUT: 0 mL / NET: 240 mL        PHYSICAL EXAM:  Vital Signs Last 24 Hrs  T(C): 36.7 (26 Dec 2022 05:06), Max: 36.8 (25 Dec 2022 21:29)  T(F): 98 (26 Dec 2022 05:06), Max: 98.2 (25 Dec 2022 21:29)  HR: 83 (26 Dec 2022 05:06) (83 - 113)  BP: 109/73 (26 Dec 2022 05:06) (109/73 - 134/98)  BP(mean): --  RR: 18 (26 Dec 2022 05:06) (18 - 18)  SpO2: 96% (26 Dec 2022 05:06) (96% - 98%)    Parameters below as of 26 Dec 2022 05:06  Patient On (Oxygen Delivery Method): room air        GENERAL: No apparent distress.   HEAD:  Atraumatic, Normocephalic  EYES: EOMI, PERRLA, conjunctiva and sclera clear  NECK: Supple, no lymphadenopathy, no elevated JVP  CHEST/LUNG: Clear to auscultation bilateral and symmetric; No wheezes, rales, or rhonchi  HEART: S1 and S2 normal. Regular rate and rhythm; No murmurs, rubs, or gallops  ABDOMEN: Soft, non-tender, non-distended; normal bowel sounds  EXTREMITIES:  2+ peripheral pulses b/l, No clubbing, cyanosis, or edema  NEUROLOGY: A&O x 3, no focal deficits  SKIN: No rashes or lesions  Psych: Affect appropriate,  no suicidal ideation.  No AH/VH  Insight: Poor  Judgement: Poor      LABS:                        14.6   8.96  )-----------( 339      ( 25 Dec 2022 07:10 )             46.1     12-25    139  |  102  |  11  ----------------------------<  88  3.7   |  20<L>  |  0.55    Ca    9.4      25 Dec 2022 07:08  Phos  3.8     12-25  Mg     2.1     12-25    TPro  7.2  /  Alb  4.2  /  TBili  0.2  /  DBili  x   /  AST  24  /  ALT  40  /  AlkPhos  108  12-24              Culture - Blood (collected 23 Dec 2022 15:40)  Source: .Blood Blood  Preliminary Report (24 Dec 2022 19:01):    No growth to date.    Culture - Blood (collected 23 Dec 2022 15:20)  Source: .Blood Blood  Preliminary Report (24 Dec 2022 19:01):    No growth to date.        RADIOLOGY & ADDITIONAL TESTS:  Lab Results Reviewed   Imaging Reviewed  Electrocardiogram Reviewed   PROGRESS NOTE:   Authored by Adalid Goodrich MD   Patient is a 42y old  Male who presents with a chief complaint of [place] (25 Dec 2022 08:00)      SUBJECTIVE / OVERNIGHT EVENTS:  No acute events overnight.   No chest pain,  dyspnea, abdominal pain, nausea, vomiting, diarrhea.  Wife updated at bedside.  No SI.     ADDITIONAL REVIEW OF SYSTEMS:    MEDICATIONS  (STANDING):  chlorhexidine 4% Liquid 1 Application(s) Topical daily  clonazePAM  Tablet 1 milliGRAM(s) Oral three times a day  enoxaparin Injectable 40 milliGRAM(s) SubCutaneous every 24 hours  melatonin 3 milliGRAM(s) Oral at bedtime  OLANZapine 5 milliGRAM(s) Oral at bedtime  venlafaxine XR. 112.5 milliGRAM(s) Oral daily    MEDICATIONS  (PRN):  acetaminophen     Tablet .. 650 milliGRAM(s) Oral every 6 hours PRN Temp greater or equal to 38C (100.4F), Moderate Pain (4 - 6), Severe Pain (7 - 10)  zolpidem 5 milliGRAM(s) Oral at bedtime PRN Insomnia  zolpidem 5 milliGRAM(s) Oral at bedtime PRN Insomnia      CAPILLARY BLOOD GLUCOSE        I&O's Summary    25 Dec 2022 07:01  -  26 Dec 2022 07:00  --------------------------------------------------------  IN: 240 mL / OUT: 0 mL / NET: 240 mL        PHYSICAL EXAM:  Vital Signs Last 24 Hrs  T(C): 36.7 (26 Dec 2022 05:06), Max: 36.8 (25 Dec 2022 21:29)  T(F): 98 (26 Dec 2022 05:06), Max: 98.2 (25 Dec 2022 21:29)  HR: 83 (26 Dec 2022 05:06) (83 - 113)  BP: 109/73 (26 Dec 2022 05:06) (109/73 - 134/98)  BP(mean): --  RR: 18 (26 Dec 2022 05:06) (18 - 18)  SpO2: 96% (26 Dec 2022 05:06) (96% - 98%)    Parameters below as of 26 Dec 2022 05:06  Patient On (Oxygen Delivery Method): room air        GENERAL: No apparent distress.   HEAD:  Atraumatic, Normocephalic  EYES: EOMI, PERRLA, conjunctiva and sclera clear  NECK: Supple, no lymphadenopathy, no elevated JVP  CHEST/LUNG: Clear to auscultation bilateral and symmetric; No wheezes, rales, or rhonchi  HEART: S1 and S2 normal. Regular rate and rhythm; No murmurs, rubs, or gallops  ABDOMEN: Soft, non-tender, non-distended; normal bowel sounds  EXTREMITIES:  2+ peripheral pulses b/l, No clubbing, cyanosis, or edema  NEUROLOGY: A&O x 3, no focal deficits  SKIN: No rashes or lesions  Psych: Affect appropriate,  no suicidal ideation.  No AH/VH  Insight: Poor  Judgement: Poor      LABS:                        14.6   8.96  )-----------( 339      ( 25 Dec 2022 07:10 )             46.1     12-25    139  |  102  |  11  ----------------------------<  88  3.7   |  20<L>  |  0.55    Ca    9.4      25 Dec 2022 07:08  Phos  3.8     12-25  Mg     2.1     12-25    TPro  7.2  /  Alb  4.2  /  TBili  0.2  /  DBili  x   /  AST  24  /  ALT  40  /  AlkPhos  108  12-24              Culture - Blood (collected 23 Dec 2022 15:40)  Source: .Blood Blood  Preliminary Report (24 Dec 2022 19:01):    No growth to date.    Culture - Blood (collected 23 Dec 2022 15:20)  Source: .Blood Blood  Preliminary Report (24 Dec 2022 19:01):    No growth to date.        RADIOLOGY & ADDITIONAL TESTS:  Lab Results Reviewed   Imaging Reviewed  Electrocardiogram Reviewed

## 2022-12-26 NOTE — PROGRESS NOTE ADULT - PROBLEM SELECTOR PLAN 2
GI PCR with Sapovirus, course typically self-limiting  Improved  Will monitor and treat symptomatically

## 2022-12-26 NOTE — BH CONSULTATION LIAISON PROGRESS NOTE - NSBHFUPINTERVALHXFT_PSY_A_CORE
Pt states he is feeling his mood is a little better today, less anxious, but affect with significant blunting, pt with decreased verbal output.  Staring at times, appears internally preoccupied but denies it.  Denies SI/HI presently, concedes he was [place] the side of the bridge, but cannot say why.  Denies AVH or paranoia.  Wife/pt expressing interest in ECT, state pt has never had imaging, inquiring about CTH.  Pt denies SE to Effexor.

## 2022-12-26 NOTE — PROGRESS NOTE ADULT - PROBLEM SELECTOR PLAN 4
likely 2/2 anxiety hx of anxiety and possible suicide attempt, PE unlikely given saturating well on RA, no evidence of hyperthyroidism, patient not in pain, afebrile.  Patient tachycardic to 115 per psychiatry  - f/u repeat ECG  - TSH wnl likely 2/2 anxiety hx of anxiety and possible suicide attempt, PE unlikely given saturating well on RA, no evidence of hyperthyroidism, patient not in pain, afebrile.  Patient tachycardic to 115 per psychiatry  - repeat ECG RBBB.   - TSH wnl

## 2022-12-26 NOTE — PROGRESS NOTE ADULT - PROBLEM SELECTOR PLAN 5
DVT: lovenox 40 mg SQ QD  Diet: Regular  Dispo: Psych inpatient DVT: lovenox 40 mg SQ QD  Diet: Regular  Dispo: Pending psych eval.

## 2022-12-26 NOTE — PROGRESS NOTE ADULT - PROBLEM SELECTOR PLAN 1
- seen by psych  - f/u psych   - cont other home meds  - cont 1:1  - wife wants pt to get treated, but is hesitant about him being inpt psych with people who have "more serious" psych disorders.  - Psych recommending - seen by psych  - f/u psych   - cont other home meds  - cont 1:1  - wife wants pt to get treated, but is hesitant about him being inpt psych with people who have "more serious" psych disorders.  - f/u psych recommendations

## 2022-12-26 NOTE — PROGRESS NOTE ADULT - PROBLEM SELECTOR PLAN 3
febrile to 101.3, tachy to 150  - f/u blood cultures  - no localizing s/s  - monitor off abx  - chest xray negative, rvp negative, ua negative.   - No rash, no abscess noted. febrile to 101.3, tachy to 150, likely due to acute gastroenteritis (sapovirus)  - Bcx NGTD - 12/23/22  - no localizing s/s  - monitor off abx  - chest xray negative, rvp negative, ua negative.   - No rash, no abscess noted.

## 2022-12-27 LAB
ANION GAP SERPL CALC-SCNC: 13 MMOL/L — SIGNIFICANT CHANGE UP (ref 5–17)
BUN SERPL-MCNC: 13 MG/DL — SIGNIFICANT CHANGE UP (ref 7–23)
CALCIUM SERPL-MCNC: 9.6 MG/DL — SIGNIFICANT CHANGE UP (ref 8.4–10.5)
CHLORIDE SERPL-SCNC: 103 MMOL/L — SIGNIFICANT CHANGE UP (ref 96–108)
CO2 SERPL-SCNC: 23 MMOL/L — SIGNIFICANT CHANGE UP (ref 22–31)
CREAT SERPL-MCNC: 0.6 MG/DL — SIGNIFICANT CHANGE UP (ref 0.5–1.3)
EGFR: 124 ML/MIN/1.73M2 — SIGNIFICANT CHANGE UP
GLUCOSE SERPL-MCNC: 94 MG/DL — SIGNIFICANT CHANGE UP (ref 70–99)
HCT VFR BLD CALC: 44.4 % — SIGNIFICANT CHANGE UP (ref 39–50)
HGB BLD-MCNC: 14.4 G/DL — SIGNIFICANT CHANGE UP (ref 13–17)
MAGNESIUM SERPL-MCNC: 2 MG/DL — SIGNIFICANT CHANGE UP (ref 1.6–2.6)
MCHC RBC-ENTMCNC: 29.9 PG — SIGNIFICANT CHANGE UP (ref 27–34)
MCHC RBC-ENTMCNC: 32.4 GM/DL — SIGNIFICANT CHANGE UP (ref 32–36)
MCV RBC AUTO: 92.3 FL — SIGNIFICANT CHANGE UP (ref 80–100)
NRBC # BLD: 0 /100 WBCS — SIGNIFICANT CHANGE UP (ref 0–0)
PHOSPHATE SERPL-MCNC: 3.4 MG/DL — SIGNIFICANT CHANGE UP (ref 2.5–4.5)
PLATELET # BLD AUTO: 369 K/UL — SIGNIFICANT CHANGE UP (ref 150–400)
POTASSIUM SERPL-MCNC: 4.1 MMOL/L — SIGNIFICANT CHANGE UP (ref 3.5–5.3)
POTASSIUM SERPL-SCNC: 4.1 MMOL/L — SIGNIFICANT CHANGE UP (ref 3.5–5.3)
RBC # BLD: 4.81 M/UL — SIGNIFICANT CHANGE UP (ref 4.2–5.8)
RBC # FLD: 12.9 % — SIGNIFICANT CHANGE UP (ref 10.3–14.5)
SARS-COV-2 RNA SPEC QL NAA+PROBE: SIGNIFICANT CHANGE UP
SODIUM SERPL-SCNC: 139 MMOL/L — SIGNIFICANT CHANGE UP (ref 135–145)
WBC # BLD: 9.82 K/UL — SIGNIFICANT CHANGE UP (ref 3.8–10.5)
WBC # FLD AUTO: 9.82 K/UL — SIGNIFICANT CHANGE UP (ref 3.8–10.5)

## 2022-12-27 PROCEDURE — 99232 SBSQ HOSP IP/OBS MODERATE 35: CPT | Mod: GC

## 2022-12-27 PROCEDURE — 99232 SBSQ HOSP IP/OBS MODERATE 35: CPT

## 2022-12-27 PROCEDURE — 70450 CT HEAD/BRAIN W/O DYE: CPT | Mod: 26

## 2022-12-27 RX ADMIN — Medication 1 MILLIGRAM(S): at 21:21

## 2022-12-27 RX ADMIN — Medication 1 MILLIGRAM(S): at 13:01

## 2022-12-27 RX ADMIN — Medication 1 MILLIGRAM(S): at 05:22

## 2022-12-27 RX ADMIN — CHLORHEXIDINE GLUCONATE 1 APPLICATION(S): 213 SOLUTION TOPICAL at 13:04

## 2022-12-27 RX ADMIN — ENOXAPARIN SODIUM 40 MILLIGRAM(S): 100 INJECTION SUBCUTANEOUS at 13:01

## 2022-12-27 RX ADMIN — OLANZAPINE 5 MILLIGRAM(S): 15 TABLET, FILM COATED ORAL at 21:21

## 2022-12-27 RX ADMIN — Medication 3 MILLIGRAM(S): at 21:21

## 2022-12-27 RX ADMIN — Medication 150 MILLIGRAM(S): at 13:04

## 2022-12-27 NOTE — BH CONSULTATION LIAISON PROGRESS NOTE - NSBHCONSULTRECOMMENDOTHER_PSY_A_CORE FT
Adjust Zyprexa to 5mg p.o. at bedtime  Adjust venlafaxine to 150mg p.o. daily  Lunesta 3mg p.o. at bedtime  melatonin 3mg p.o. at bedtime    Klonopin to 1mg PO TID standing    consider CTH    inpt psych admission once medically cleared
Adjust Zyprexa to 5mg p.o. at bedtime  Adjust venlafaxine to 112.5mg p.o. daily  Lunesta 3mg p.o. at bedtime  melatonin 3mg p.o. at bedtime
Adjust Zyprexa to 5mg p.o. at bedtime  Adjust venlafaxine to 112.5mg p.o. daily  Lunesta 3mg p.o. at bedtime  melatonin 3mg p.o. at bedtime    Please change Klonopin to 1mg PO TID standing
Adjust Zyprexa to 5mg p.o. at bedtime  Adjust venlafaxine to 150mg p.o. daily  Lunesta 3mg p.o. at bedtime  melatonin 3mg p.o. at bedtime    Klonopin to 1mg PO TID standing    consider CTH    inpt psych admission once medically cleared

## 2022-12-27 NOTE — BH CONSULTATION LIAISON PROGRESS NOTE - NSBHCHARTREVIEWLAB_PSY_A_CORE FT
14.4   11.04 )-----------( 352      ( 23 Dec 2022 11:18 )             44.8         138  |  104  |  11  ----------------------------<  134<H>  3.5   |  22  |  0.63    Ca    9.0      23 Dec 2022 11:18    TPro  7.4  /  Alb  4.5  /  TBili  0.3  /  DBili  x   /  AST  22  /  ALT  37  /  AlkPhos  121<H>  12-23    Urinalysis Basic - ( 23 Dec 2022 11:20 )    Color: Yellow / Appearance: Slightly Turbid / S.027 / pH: x  Gluc: x / Ketone: Small  / Bili: Negative / Urobili: 3 mg/dL   Blood: x / Protein: 30 mg/dL / Nitrite: Negative   Leuk Esterase: Negative / RBC: 5 /hpf / WBC 3 /HPF   Sq Epi: x / Non Sq Epi: 1 /hpf / Bacteria: Negative    
                      14.6   8.96  )-----------( 339      ( 25 Dec 2022 07:10 )             46.1     12-25    139  |  102  |  11  ----------------------------<  88  3.7   |  20<L>  |  0.55    Ca    9.4      25 Dec 2022 07:08  Phos  3.8     12-25  Mg     2.1     12-25    TPro  7.2  /  Alb  4.2  /  TBili  0.2  /  DBili  x   /  AST  24  /  ALT  40  /  AlkPhos  108  12-24    
                      14.6   8.96  )-----------( 339      ( 25 Dec 2022 07:10 )             46.1     12-25    139  |  102  |  11  ----------------------------<  88  3.7   |  20<L>  |  0.55    Ca    9.4      25 Dec 2022 07:08  Phos  3.8     12-25  Mg     2.1     12-25      
                      14.6   8.96  )-----------( 339      ( 25 Dec 2022 07:10 )             46.1     12-25    139  |  102  |  11  ----------------------------<  88  3.7   |  20<L>  |  0.55    Ca    9.4      25 Dec 2022 07:08  Phos  3.8     12-25  Mg     2.1     12-25

## 2022-12-27 NOTE — BH CONSULTATION LIAISON PROGRESS NOTE - NSBHFUPINTERVALHXFT_PSY_A_CORE
Pt seen and assessed at bedside with wife present. Pt is A&Ox4. Denies current SI/HI/AVH. Per wife, pt was more sedated yesterday but that he is more close to baseline mentation today. Pt endorses insomnia but adequate appetite. Pt is expressing anxiety surrounding his admission status to an inpatient psychiatric facility as he would prefer to be treated outpatient. Pt's wife stated the pt was catatonic during his first psychiatric admission to [facility] and they both voiced concerns about that happening again.

## 2022-12-27 NOTE — PROGRESS NOTE ADULT - SUBJECTIVE AND OBJECTIVE BOX
PROGRESS NOTE:   Authored by Adalid Goodrich MD   Patient is a 42y old  Male who presents with a chief complaint of walking on bridge (26 Dec 2022 07:01)      SUBJECTIVE / OVERNIGHT EVENTS:  No acute events overnight.   No chest pain,  palpitations, dyspnea, abdominal pain, nausea, vomiting, diarrhea, blurry vision, dysuria, lightheadedness, dizziness.    ADDITIONAL REVIEW OF SYSTEMS:    MEDICATIONS  (STANDING):  chlorhexidine 4% Liquid 1 Application(s) Topical daily  clonazePAM  Tablet 1 milliGRAM(s) Oral three times a day  enoxaparin Injectable 40 milliGRAM(s) SubCutaneous every 24 hours  melatonin 3 milliGRAM(s) Oral at bedtime  OLANZapine 5 milliGRAM(s) Oral at bedtime  venlafaxine XR. 150 milliGRAM(s) Oral daily    MEDICATIONS  (PRN):  acetaminophen     Tablet .. 650 milliGRAM(s) Oral every 6 hours PRN Temp greater or equal to 38C (100.4F), Moderate Pain (4 - 6), Severe Pain (7 - 10)  zolpidem 5 milliGRAM(s) Oral at bedtime PRN Insomnia  zolpidem 5 milliGRAM(s) Oral at bedtime PRN Insomnia      CAPILLARY BLOOD GLUCOSE        I&O's Summary      PHYSICAL EXAM:  Vital Signs Last 24 Hrs  T(C): 36.6 (27 Dec 2022 05:33), Max: 37.1 (26 Dec 2022 12:47)  T(F): 97.8 (27 Dec 2022 05:33), Max: 98.7 (26 Dec 2022 12:47)  HR: 125 (27 Dec 2022 05:33) (101 - 125)  BP: 122/88 (27 Dec 2022 05:33) (120/80 - 126/84)  BP(mean): --  RR: 18 (27 Dec 2022 05:33) (16 - 18)  SpO2: 97% (27 Dec 2022 05:33) (96% - 98%)    Parameters below as of 27 Dec 2022 05:33  Patient On (Oxygen Delivery Method): room air      GENERAL: No apparent distress.   HEAD:  Atraumatic, Normocephalic  EYES: EOMI, PERRLA, conjunctiva and sclera clear  NECK: Supple, no lymphadenopathy, no elevated JVP  CHEST/LUNG: Clear to auscultation bilateral and symmetric; No wheezes, rales, or rhonchi  HEART: S1 and S2 normal. Regular rate and rhythm; No murmurs, rubs, or gallops  ABDOMEN: Soft, non-tender, non-distended; normal bowel sounds  EXTREMITIES:  2+ peripheral pulses b/l, No clubbing, cyanosis, or edema  NEUROLOGY: A&O x 3, no focal deficits  SKIN: No rashes or lesions  Psych: Affect appropriate,  no suicidal ideation.  No AH/  Insight: Poor  Judgement: Poor      LABS:                        14.4   9.82  )-----------( 369      ( 27 Dec 2022 06:38 )             44.4     12-25    139  |  102  |  11  ----------------------------<  88  3.7   |  20<L>  |  0.55    Ca    9.4      25 Dec 2022 07:08  Phos  3.8     12-25  Mg     2.1     12-25                  RADIOLOGY & ADDITIONAL TESTS:  Lab Results Reviewed   Imaging Reviewed  Electrocardiogram Reviewed   PROGRESS NOTE:   Authored by Adalid Goodrich MD   Patient is a 42y old  Male who presents with a chief complaint of walking on bridge (26 Dec 2022 07:01)      SUBJECTIVE / OVERNIGHT EVENTS:  No acute events overnight.   No chest pain,  palpitations, dyspnea, abdominal pain, nausea, vomiting, diarrhea.    Wife updated at bedside with patient.    ADDITIONAL REVIEW OF SYSTEMS:    MEDICATIONS  (STANDING):  chlorhexidine 4% Liquid 1 Application(s) Topical daily  clonazePAM  Tablet 1 milliGRAM(s) Oral three times a day  enoxaparin Injectable 40 milliGRAM(s) SubCutaneous every 24 hours  melatonin 3 milliGRAM(s) Oral at bedtime  OLANZapine 5 milliGRAM(s) Oral at bedtime  venlafaxine XR. 150 milliGRAM(s) Oral daily    MEDICATIONS  (PRN):  acetaminophen     Tablet .. 650 milliGRAM(s) Oral every 6 hours PRN Temp greater or equal to 38C (100.4F), Moderate Pain (4 - 6), Severe Pain (7 - 10)  zolpidem 5 milliGRAM(s) Oral at bedtime PRN Insomnia  zolpidem 5 milliGRAM(s) Oral at bedtime PRN Insomnia      CAPILLARY BLOOD GLUCOSE        I&O's Summary      PHYSICAL EXAM:  Vital Signs Last 24 Hrs  T(C): 36.6 (27 Dec 2022 05:33), Max: 37.1 (26 Dec 2022 12:47)  T(F): 97.8 (27 Dec 2022 05:33), Max: 98.7 (26 Dec 2022 12:47)  HR: 125 (27 Dec 2022 05:33) (101 - 125)  BP: 122/88 (27 Dec 2022 05:33) (120/80 - 126/84)  BP(mean): --  RR: 18 (27 Dec 2022 05:33) (16 - 18)  SpO2: 97% (27 Dec 2022 05:33) (96% - 98%)    Parameters below as of 27 Dec 2022 05:33  Patient On (Oxygen Delivery Method): room air      GENERAL: No apparent distress.   HEAD:  Atraumatic, Normocephalic  EYES: EOMI, PERRLA, conjunctiva and sclera clear  NECK: Supple, no lymphadenopathy, no elevated JVP  CHEST/LUNG: Clear to auscultation bilateral and symmetric; No wheezes, rales, or rhonchi  HEART: S1 and S2 normal. Regular rate and rhythm; No murmurs, rubs, or gallops  ABDOMEN: Soft, non-tender, non-distended; normal bowel sounds  EXTREMITIES:  2+ peripheral pulses b/l, No clubbing, cyanosis, or edema  NEUROLOGY: A&O x 3, no focal deficits  SKIN: No rashes or lesions  Psych: Affect appropriate,  no suicidal ideation.  No AH/  Insight: Poor  Judgement: Poor      LABS:                        14.4   9.82  )-----------( 369      ( 27 Dec 2022 06:38 )             44.4     12-25    139  |  102  |  11  ----------------------------<  88  3.7   |  20<L>  |  0.55    Ca    9.4      25 Dec 2022 07:08  Phos  3.8     12-25  Mg     2.1     12-25                  RADIOLOGY & ADDITIONAL TESTS:  Lab Results Reviewed   Imaging Reviewed  Electrocardiogram Reviewed

## 2022-12-27 NOTE — PROGRESS NOTE ADULT - PROBLEM SELECTOR PLAN 4
likely 2/2 anxiety hx of anxiety and possible suicide attempt, PE unlikely given saturating well on RA, no evidence of hyperthyroidism, patient not in pain, afebrile.  Patient tachycardic to 115 per psychiatry  - repeat ECG RBBB.   - TSH wnl

## 2022-12-27 NOTE — BH CONSULTATION LIAISON PROGRESS NOTE - NSBHCHARTREVIEWINVESTIGATE_PSY_A_CORE FT
< from: 12 Lead ECG (12.23.22 @ 10:31) >      Ventricular Rate 139 BPM    Atrial Rate 139 BPM    P-R Interval 120 ms    QRS Duration 116 ms    Q-T Interval 288 ms    QTC Calculation(Bazett) 438 ms    P Axis 68 degrees    R Axis 92 degrees    T Axis -5 degrees    Diagnosis Line SINUS TACHYCARDIA  RIGHT BUNDLE BRANCH BLOCK  T WAVE ABNORMALITY, CONSIDER INFERIOR ISCHEMIA  ABNORMAL ECG  NO PREVIOUS ECGS AVAILABLE  Confirmed by MD Mehnaz, [facility] ([id number]) on 12/24/2022 1:44:09 PM    < end of copied text >    
< from: 12 Lead ECG (12.23.22 @ 10:31) >      Ventricular Rate 139 BPM    Atrial Rate 139 BPM    P-R Interval 120 ms    QRS Duration 116 ms    Q-T Interval 288 ms    QTC Calculation(Bazett) 438 ms    P Axis 68 degrees    R Axis 92 degrees    T Axis -5 degrees    Diagnosis Line SINUS TACHYCARDIA  RIGHT BUNDLE BRANCH BLOCK  T WAVE ABNORMALITY, CONSIDER INFERIOR ISCHEMIA  ABNORMAL ECG  NO PREVIOUS ECGS AVAILABLE  Confirmed by MD Mehnaz, [facility] ([id number]) on 12/24/2022 1:44:09 PM    < end of copied text >    
< from: 12 Lead ECG (12.23.22 @ 10:31) >      Ventricular Rate 139 BPM    Atrial Rate 139 BPM    P-R Interval 120 ms    QRS Duration 116 ms    Q-T Interval 288 ms    QTC Calculation(Bazett) 438 ms    P Axis 68 degrees    R Axis 92 degrees    T Axis -5 degrees    Diagnosis Line SINUS TACHYCARDIA  RIGHT BUNDLE BRANCH BLOCK  T WAVE ABNORMALITY, CONSIDER INFERIOR ISCHEMIA  ABNORMAL ECG  NO PREVIOUS ECGS AVAILABLE  Confirmed by MD Mehnaz, [facility] ([id number]) on 12/24/2022 1:44:09 PM    < end of copied text >

## 2022-12-27 NOTE — PROGRESS NOTE ADULT - PROBLEM SELECTOR PLAN 1
- seen by psych  - f/u psych   - cont other home meds  - cont 1:1  - wife wants pt to get treated, but is hesitant about him being inpt psych with people who have "more serious" psych disorders.  - f/u psych recommendations

## 2022-12-27 NOTE — PROGRESS NOTE ADULT - PROBLEM SELECTOR PLAN 3
febrile to 101.3, tachy to 150, likely due to acute gastroenteritis (sapovirus)  - Bcx NGTD - 12/23/22  - no localizing s/s  - monitor off abx  - chest xray negative, rvp negative, ua negative.   - No rash, no abscess noted.

## 2022-12-28 LAB
CULTURE RESULTS: SIGNIFICANT CHANGE UP
SARS-COV-2 RNA SPEC QL NAA+PROBE: SIGNIFICANT CHANGE UP
SPECIMEN SOURCE: SIGNIFICANT CHANGE UP

## 2022-12-28 PROCEDURE — 99232 SBSQ HOSP IP/OBS MODERATE 35: CPT

## 2022-12-28 PROCEDURE — 99232 SBSQ HOSP IP/OBS MODERATE 35: CPT | Mod: GC

## 2022-12-28 RX ADMIN — Medication 150 MILLIGRAM(S): at 12:56

## 2022-12-28 RX ADMIN — Medication 1 MILLIGRAM(S): at 21:17

## 2022-12-28 RX ADMIN — Medication 1 MILLIGRAM(S): at 06:28

## 2022-12-28 RX ADMIN — Medication 3 MILLIGRAM(S): at 21:17

## 2022-12-28 RX ADMIN — OLANZAPINE 5 MILLIGRAM(S): 15 TABLET, FILM COATED ORAL at 21:17

## 2022-12-28 RX ADMIN — Medication 1 MILLIGRAM(S): at 14:23

## 2022-12-28 RX ADMIN — CHLORHEXIDINE GLUCONATE 1 APPLICATION(S): 213 SOLUTION TOPICAL at 12:56

## 2022-12-28 NOTE — PROGRESS NOTE ADULT - PROBLEM SELECTOR PLAN 1
- seen by psych  - f/u psych   - cont other home meds  - cont 1:1  - wife wants pt to get treated, but is hesitant about him being inpt psych with people who have "more serious" psych disorders.  - Pending inpatient psych placement. - seen by psych  - f/u psych   - cont other home meds  - cont 1:1  - wife wants pt to get treated, but is hesitant about him being inpt psych with people who have "more serious" psych disorders.  - Pending inpatient psych placement  - CT head negative

## 2022-12-28 NOTE — PROGRESS NOTE ADULT - SUBJECTIVE AND OBJECTIVE BOX
PROGRESS NOTE:   Authored by Adalid Goodrich MD   Patient is a 42y old  Male who presents with a chief complaint of walking on bridge (27 Dec 2022 07:02)      SUBJECTIVE / OVERNIGHT EVENTS:  No acute events overnight.   No chest pain,  palpitations, dyspnea, abdominal pain, nausea, vomiting, diarrhea.    ADDITIONAL REVIEW OF SYSTEMS:    MEDICATIONS  (STANDING):  chlorhexidine 4% Liquid 1 Application(s) Topical daily  clonazePAM  Tablet 1 milliGRAM(s) Oral three times a day  enoxaparin Injectable 40 milliGRAM(s) SubCutaneous every 24 hours  melatonin 3 milliGRAM(s) Oral at bedtime  OLANZapine 5 milliGRAM(s) Oral at bedtime  venlafaxine XR. 150 milliGRAM(s) Oral daily    MEDICATIONS  (PRN):  acetaminophen     Tablet .. 650 milliGRAM(s) Oral every 6 hours PRN Temp greater or equal to 38C (100.4F), Moderate Pain (4 - 6), Severe Pain (7 - 10)  zolpidem 5 milliGRAM(s) Oral at bedtime PRN Insomnia  zolpidem 5 milliGRAM(s) Oral at bedtime PRN Insomnia      CAPILLARY BLOOD GLUCOSE        I&O's Summary    27 Dec 2022 07:01  -  28 Dec 2022 07:00  --------------------------------------------------------  IN: 480 mL / OUT: 0 mL / NET: 480 mL        PHYSICAL EXAM:  Vital Signs Last 24 Hrs  T(C): 36.4 (28 Dec 2022 04:53), Max: 37.1 (27 Dec 2022 13:02)  T(F): 97.6 (28 Dec 2022 04:53), Max: 98.8 (27 Dec 2022 13:02)  HR: 78 (28 Dec 2022 04:53) (78 - 114)  BP: 101/65 (28 Dec 2022 04:53) (101/65 - 138/85)  BP(mean): --  RR: 18 (28 Dec 2022 04:53) (16 - 18)  SpO2: 98% (28 Dec 2022 04:53) (97% - 98%)    Parameters below as of 28 Dec 2022 04:53  Patient On (Oxygen Delivery Method): room air        GENERAL: No apparent distress.   HEAD:  Atraumatic, Normocephalic  EYES: EOMI, PERRLA, conjunctiva and sclera clear  NECK: Supple, no lymphadenopathy, no elevated JVP  CHEST/LUNG: Clear to auscultation bilateral and symmetric; No wheezes, rales, or rhonchi  HEART: S1 and S2 normal. Regular rate and rhythm; No murmurs, rubs, or gallops  ABDOMEN: Soft, non-tender, non-distended; normal bowel sounds  EXTREMITIES:  2+ peripheral pulses b/l, No clubbing, cyanosis, or edema  NEUROLOGY: A&O x 3, no focal deficits  SKIN: No rashes or lesions  Psych: Affect appropriate,  no suicidal ideation.  No /  Insight: Poor  Judgement: Poor      LABS:                        14.4   9.82  )-----------( 369      ( 27 Dec 2022 06:38 )             44.4     12-27    139  |  103  |  13  ----------------------------<  94  4.1   |  23  |  0.60    Ca    9.6      27 Dec 2022 06:38  Phos  3.4     12-27  Mg     2.0     12-27                  RADIOLOGY & ADDITIONAL TESTS:  Lab Results Reviewed   Imaging Reviewed  Electrocardiogram Reviewed

## 2022-12-28 NOTE — BH CONSULTATION LIAISON PROGRESS NOTE - NSBHFUPINTERVALHXFT_PSY_A_CORE
Pt assessed at bedside with wife and 1:1 present. Pt sitting in a chair and states he shaved today. Pt endorses continued anxiety about where he will be admitted to for his inpatient placement. ECT discussed with pt and he continues to voice interest in the addition to his treatment options. Pt endorses better sleep last night and adequate appetite. Denies medication SE. Denies current SI/HI/AVH. Pt's psychiatrist, Telly De La Paz, was made aware of pt's pending admission to an inpatient psychiatry facility.  Pt assessed at bedside with wife and 1:1 present. Pt sitting in a chair and states he shaved today. Pt endorses continued anxiety about where he will be admitted to for his inpatient placement. ECT discussed with pt and he continues to voice interest in the addition to his treatment options. Pt endorses better sleep last night and adequate appetite. Denies medication SE. Denies current SI/HI/AVH. Pt's psychiatrist, Dr. Telly De La Paz, was made aware of pt's pending admission to an inpatient psychiatry facility.

## 2022-12-28 NOTE — PROGRESS NOTE ADULT - PROBLEM SELECTOR PLAN 5
DVT: lovenox 40 mg SQ QD  Diet: Regular  Dispo: Inpatient Psych DVT: Ambulate as tolerated  Diet: Regular  Dispo: Inpatient Psych

## 2022-12-29 ENCOUNTER — TRANSCRIPTION ENCOUNTER (OUTPATIENT)
Age: 42
End: 2022-12-29

## 2022-12-29 VITALS
SYSTOLIC BLOOD PRESSURE: 130 MMHG | RESPIRATION RATE: 18 BRPM | OXYGEN SATURATION: 95 % | TEMPERATURE: 99 F | HEART RATE: 109 BPM | DIASTOLIC BLOOD PRESSURE: 80 MMHG

## 2022-12-29 PROCEDURE — 84100 ASSAY OF PHOSPHORUS: CPT

## 2022-12-29 PROCEDURE — 85027 COMPLETE CBC AUTOMATED: CPT

## 2022-12-29 PROCEDURE — 0225U NFCT DS DNA&RNA 21 SARSCOV2: CPT

## 2022-12-29 PROCEDURE — 85610 PROTHROMBIN TIME: CPT

## 2022-12-29 PROCEDURE — 93308 TTE F-UP OR LMTD: CPT

## 2022-12-29 PROCEDURE — 36415 COLL VENOUS BLD VENIPUNCTURE: CPT

## 2022-12-29 PROCEDURE — 83605 ASSAY OF LACTIC ACID: CPT

## 2022-12-29 PROCEDURE — 85025 COMPLETE CBC W/AUTO DIFF WBC: CPT

## 2022-12-29 PROCEDURE — 84436 ASSAY OF TOTAL THYROXINE: CPT

## 2022-12-29 PROCEDURE — 87640 STAPH A DNA AMP PROBE: CPT

## 2022-12-29 PROCEDURE — 80053 COMPREHEN METABOLIC PANEL: CPT

## 2022-12-29 PROCEDURE — 82803 BLOOD GASES ANY COMBINATION: CPT

## 2022-12-29 PROCEDURE — 84295 ASSAY OF SERUM SODIUM: CPT

## 2022-12-29 PROCEDURE — U0003: CPT

## 2022-12-29 PROCEDURE — 82947 ASSAY GLUCOSE BLOOD QUANT: CPT

## 2022-12-29 PROCEDURE — 70450 CT HEAD/BRAIN W/O DYE: CPT

## 2022-12-29 PROCEDURE — 87641 MR-STAPH DNA AMP PROBE: CPT

## 2022-12-29 PROCEDURE — U0005: CPT

## 2022-12-29 PROCEDURE — 82435 ASSAY OF BLOOD CHLORIDE: CPT

## 2022-12-29 PROCEDURE — 81001 URINALYSIS AUTO W/SCOPE: CPT

## 2022-12-29 PROCEDURE — 93005 ELECTROCARDIOGRAM TRACING: CPT

## 2022-12-29 PROCEDURE — 82550 ASSAY OF CK (CPK): CPT

## 2022-12-29 PROCEDURE — 85014 HEMATOCRIT: CPT

## 2022-12-29 PROCEDURE — 99239 HOSP IP/OBS DSCHRG MGMT >30: CPT | Mod: GC

## 2022-12-29 PROCEDURE — 87040 BLOOD CULTURE FOR BACTERIA: CPT

## 2022-12-29 PROCEDURE — 71045 X-RAY EXAM CHEST 1 VIEW: CPT

## 2022-12-29 PROCEDURE — 84484 ASSAY OF TROPONIN QUANT: CPT

## 2022-12-29 PROCEDURE — 99232 SBSQ HOSP IP/OBS MODERATE 35: CPT

## 2022-12-29 PROCEDURE — 80048 BASIC METABOLIC PNL TOTAL CA: CPT

## 2022-12-29 PROCEDURE — 85730 THROMBOPLASTIN TIME PARTIAL: CPT

## 2022-12-29 PROCEDURE — 83735 ASSAY OF MAGNESIUM: CPT

## 2022-12-29 PROCEDURE — 87637 SARSCOV2&INF A&B&RSV AMP PRB: CPT

## 2022-12-29 PROCEDURE — 82330 ASSAY OF CALCIUM: CPT

## 2022-12-29 PROCEDURE — 84480 ASSAY TRIIODOTHYRONINE (T3): CPT

## 2022-12-29 PROCEDURE — 84443 ASSAY THYROID STIM HORMONE: CPT

## 2022-12-29 PROCEDURE — 99285 EMERGENCY DEPT VISIT HI MDM: CPT

## 2022-12-29 PROCEDURE — 84132 ASSAY OF SERUM POTASSIUM: CPT

## 2022-12-29 PROCEDURE — 85018 HEMOGLOBIN: CPT

## 2022-12-29 PROCEDURE — 87507 IADNA-DNA/RNA PROBE TQ 12-25: CPT

## 2022-12-29 PROCEDURE — 80307 DRUG TEST PRSMV CHEM ANLYZR: CPT

## 2022-12-29 RX ORDER — VENLAFAXINE HCL 75 MG
1 CAPSULE, EXT RELEASE 24 HR ORAL
Qty: 0 | Refills: 0 | DISCHARGE
Start: 2022-12-29

## 2022-12-29 RX ORDER — OLANZAPINE 15 MG/1
1 TABLET, FILM COATED ORAL
Qty: 0 | Refills: 0 | DISCHARGE
Start: 2022-12-29

## 2022-12-29 RX ORDER — ZOLPIDEM TARTRATE 10 MG/1
1 TABLET ORAL
Qty: 0 | Refills: 0 | DISCHARGE
Start: 2022-12-29

## 2022-12-29 RX ORDER — LANOLIN ALCOHOL/MO/W.PET/CERES
1 CREAM (GRAM) TOPICAL
Qty: 0 | Refills: 0 | DISCHARGE
Start: 2022-12-29

## 2022-12-29 RX ORDER — CLONAZEPAM 1 MG
1 TABLET ORAL
Qty: 0 | Refills: 0 | DISCHARGE
Start: 2022-12-29

## 2022-12-29 RX ADMIN — Medication 1 MILLIGRAM(S): at 06:03

## 2022-12-29 RX ADMIN — Medication 1 MILLIGRAM(S): at 15:01

## 2022-12-29 RX ADMIN — CHLORHEXIDINE GLUCONATE 1 APPLICATION(S): 213 SOLUTION TOPICAL at 12:03

## 2022-12-29 RX ADMIN — Medication 150 MILLIGRAM(S): at 12:03

## 2022-12-29 NOTE — PROGRESS NOTE ADULT - PROBLEM SELECTOR PLAN 1
- seen by psych  - f/u psych   - cont other home meds  - cont 1:1  - wife wants pt to get treated, but is hesitant about him being inpt psych with people who have "more serious" psych disorders.  - Pending inpatient psych placement  - CT head negative

## 2022-12-29 NOTE — BH CONSULTATION LIAISON PROGRESS NOTE - NSBHMSESPABN_PSY_A_CORE
Soft volume
Soft volume/Slowed rate
Slowed rate
Soft volume/Slowed rate/Decreased productivity/Increased latency
Soft volume
Soft volume

## 2022-12-29 NOTE — BH CONSULTATION LIAISON PROGRESS NOTE - NSBHASSESSMENTFT_PSY_ALL_CORE
Pt is a 41 y/o ,  male, currently domiciled with his wife, 2 children, and mother-in-law, employed as an . Pt was previously hospitalized at [facility] in September 2021 for a SA, currently seeing a psychiatrist, Dr. Telly De La Paz, on Venlafaxine 75mg, Clonazepam 1mg, Olanzapine, and Lunesta 3mg, diagnosed with MDD. No PMHx, no forensic hx, no substance use disorder hx. BIB EMS for potential suicide attempt. Per chart, the police called EMS after pt was seen by passerby looking over the [place] and became concerned for his safety. Psychiatry consulted for SI.     Patient continues to be an unsafe discharge. He is denying current SI/HI/AVH, but his affect and prior actions along with wife's provided history is concerning for pt's safety.     Plan:  -Most likely psychiatric inpatient admission  -Continue current medication regimen recommendations
This is a 42-y.o. HM patient, , currently domiciled with his wife, 2 children, and mother-in-law, employed as an , brought in by [facility], contemplating suicide. Consult requested to evaluate for suicidality.    Pt was previously hospitalized at [facility] in September 2021 for a SA, currently seeing a psychiatrist, Dr. Telly De La Paz [phone], on Venlafaxine 75mg, Clonazepam 1mg, Olanzapine, and Lunesta 3mg, diagnosed with MDD. No PMHx, no forensic hx, no substance use disorder hx. Patient remains depressed and overall unwell with elevated HR, at present time, he is an unsafe discharge. 2PC legals in the chart.    Message left for Dr. De La Paz, response pending.
This is a 42-y.o. HM patient, , currently domiciled with his wife, 2 children, and mother-in-law, employed as an , brought in by [facility] after found at [facility], contemplating suicide. Consult requested to evaluate for suicidality.    Pt was previously hospitalized at [facility] in September 2021 for a SA, currently seeing a psychiatrist, Dr. Telly De La Paz [phone], on Venlafaxine 75mg, Clonazepam 1mg, Olanzapine, and Lunesta 3mg, diagnosed with MDD. No PMHx, no forensic hx, no substance use disorder hx. Patient remains depressed and overall unwell with elevated HR, at present time, he is an unsafe discharge. 2PC legals in the chart.
Pt is a 41 y/o ,  male, currently domiciled with his wife, 2 children, and mother-in-law, employed as an . Pt was previously hospitalized at [facility] in September 2021 for a SA, currently seeing a psychiatrist, Dr. Telly De La Paz, on Venlafaxine 75mg, Clonazepam 1mg, Olanzapine, and Lunesta 3mg, diagnosed with MDD. No PMHx, no forensic hx, no substance use disorder hx. BIB EMS for potential suicide attempt. Per chart, the police called EMS after pt was seen by passerby looking over the [place] and became concerned for his safety. Psychiatry consulted for SI.     Patient continues to be an unsafe discharge. He is denying current SI/HI/AVH, but his affect and prior actions along with wife's provided history is concerning for pt's safety. Plan is to transfer to inpatient psychiatry.    Plan:  -Continue 1:1  -Awaiting placement to inpatient psychiatry facility for potential d/c
Pt is a 43 y/o ,  male, currently domiciled with his wife, 2 children, and mother-in-law, employed as an . Pt was previously hospitalized at [facility] in September 2021 for a SA, currently seeing a psychiatrist, Dr. Telly De La Paz, on Venlafaxine 75mg, Clonazepam 1mg, Olanzapine, and Lunesta 3mg, diagnosed with MDD. No PMHx, no forensic hx, no substance use disorder hx. BIB EMS for potential suicide attempt. Per chart, the police called EMS after pt was seen by passerby looking [place] and became concerned for his safety. Psychiatry consulted for SI.     Patient continues to be an unsafe discharge. He is denying current SI/HI/AVH, but his affect and prior actions along with wife's provided history is concerning for pt's safety. Plan is to transfer to inpatient psychiatry facility. 
This is a 42-y.o. HM patient, , currently domiciled with his wife, 2 children, and mother-in-law, employed as an , brought in by [facility] after found at [facility], contemplating suicide. Consult requested to evaluate for suicidality.  Pt was previously hospitalized at [facility] in September 2021 for a SA, currently seeing a psychiatrist, Dr. Telly De La Paz [phone], on Venlafaxine 75mg, Clonazepam 1mg, Olanzapine, and Lunesta 3mg, diagnosed with MDD. No PMHx, no forensic hx, no substance use disorder hx. Patient remains depressed and overall unwell with elevated HR, at present time, he is an unsafe discharge. 2PC legals in the chart.  R/o bipolar disorder.

## 2022-12-29 NOTE — DISCHARGE NOTE NURSING/CASE MANAGEMENT/SOCIAL WORK - PATIENT PORTAL LINK FT
You can access the FollowMyHealth Patient Portal offered by [facility] by registering at the following website: [email or web address]. By joining [facility] FollowMyHealth portal, you will also be able to view your health information using other applications (apps) compatible with our system.

## 2022-12-29 NOTE — PROGRESS NOTE ADULT - SUBJECTIVE AND OBJECTIVE BOX
PROGRESS NOTE:   Authored by Adalid Goodrich MD   Patient is a 42y old  Male who presents with a chief complaint of walking on bridge (28 Dec 2022 07:16)      SUBJECTIVE / OVERNIGHT EVENTS:  No acute events overnight.   No chest pain, dyspnea, abdominal pain, nausea, vomiting, diarrhea.    ADDITIONAL REVIEW OF SYSTEMS:    MEDICATIONS  (STANDING):  chlorhexidine 4% Liquid 1 Application(s) Topical daily  clonazePAM  Tablet 1 milliGRAM(s) Oral three times a day  melatonin 3 milliGRAM(s) Oral at bedtime  OLANZapine 5 milliGRAM(s) Oral at bedtime  venlafaxine XR. 150 milliGRAM(s) Oral daily    MEDICATIONS  (PRN):  acetaminophen     Tablet .. 650 milliGRAM(s) Oral every 6 hours PRN Temp greater or equal to 38C (100.4F), Moderate Pain (4 - 6), Severe Pain (7 - 10)  zolpidem 5 milliGRAM(s) Oral at bedtime PRN Insomnia  zolpidem 5 milliGRAM(s) Oral at bedtime PRN Insomnia      CAPILLARY BLOOD GLUCOSE        I&O's Summary      PHYSICAL EXAM:  Vital Signs Last 24 Hrs  T(C): 36.7 (29 Dec 2022 06:11), Max: 37.2 (28 Dec 2022 21:46)  T(F): 98 (29 Dec 2022 06:11), Max: 98.9 (28 Dec 2022 21:46)  HR: 92 (29 Dec 2022 06:11) (92 - 110)  BP: 115/76 (29 Dec 2022 06:11) (113/75 - 117/72)  BP(mean): --  RR: 18 (29 Dec 2022 06:11) (16 - 18)  SpO2: 98% (29 Dec 2022 06:11) (96% - 99%)    Parameters below as of 29 Dec 2022 06:11  Patient On (Oxygen Delivery Method): room air    GENERAL: No apparent distress.   HEAD:  Atraumatic, Normocephalic  EYES: EOMI, PERRLA, conjunctiva and sclera clear  NECK: Supple, no lymphadenopathy, no elevated JVP  CHEST/LUNG: Clear to auscultation bilateral and symmetric; No wheezes, rales, or rhonchi  HEART: S1 and S2 normal. Regular rate and rhythm; No murmurs, rubs, or gallops  ABDOMEN: Soft, non-tender, non-distended; normal bowel sounds  EXTREMITIES:  2+ peripheral pulses b/l, No clubbing, cyanosis, or edema  NEUROLOGY: A&O x 3, no focal deficits  SKIN: No rashes or lesions  Psych: Affect appropriate,  no suicidal ideation.  Insight: Poor  Judgement: Poor      LABS:                      RADIOLOGY & ADDITIONAL TESTS:  Lab Results Reviewed   Imaging Reviewed  Electrocardiogram Reviewed   PROGRESS NOTE:   Authored by Adalid Goodrich MD   Patient is a 42y old  Male who presents with a chief complaint of [place] bridge (28 Dec 2022 07:16)      SUBJECTIVE / OVERNIGHT EVENTS:  No acute events overnight.   No chest pain, dyspnea, abdominal pain, nausea, vomiting, diarrhea.  Wife updated at bedside.     ADDITIONAL REVIEW OF SYSTEMS:    MEDICATIONS  (STANDING):  chlorhexidine 4% Liquid 1 Application(s) Topical daily  clonazePAM  Tablet 1 milliGRAM(s) Oral three times a day  melatonin 3 milliGRAM(s) Oral at bedtime  OLANZapine 5 milliGRAM(s) Oral at bedtime  venlafaxine XR. 150 milliGRAM(s) Oral daily    MEDICATIONS  (PRN):  acetaminophen     Tablet .. 650 milliGRAM(s) Oral every 6 hours PRN Temp greater or equal to 38C (100.4F), Moderate Pain (4 - 6), Severe Pain (7 - 10)  zolpidem 5 milliGRAM(s) Oral at bedtime PRN Insomnia  zolpidem 5 milliGRAM(s) Oral at bedtime PRN Insomnia      CAPILLARY BLOOD GLUCOSE        I&O's Summary      PHYSICAL EXAM:  Vital Signs Last 24 Hrs  T(C): 36.7 (29 Dec 2022 06:11), Max: 37.2 (28 Dec 2022 21:46)  T(F): 98 (29 Dec 2022 06:11), Max: 98.9 (28 Dec 2022 21:46)  HR: 92 (29 Dec 2022 06:11) (92 - 110)  BP: 115/76 (29 Dec 2022 06:11) (113/75 - 117/72)  BP(mean): --  RR: 18 (29 Dec 2022 06:11) (16 - 18)  SpO2: 98% (29 Dec 2022 06:11) (96% - 99%)    Parameters below as of 29 Dec 2022 06:11  Patient On (Oxygen Delivery Method): room air    GENERAL: No apparent distress.   HEAD:  Atraumatic, Normocephalic  EYES: EOMI, PERRLA, conjunctiva and sclera clear  NECK: Supple, no lymphadenopathy, no elevated JVP  CHEST/LUNG: Clear to auscultation bilateral and symmetric; No wheezes, rales, or rhonchi  HEART: S1 and S2 normal. Regular rate and rhythm; No murmurs, rubs, or gallops  ABDOMEN: Soft, non-tender, non-distended; normal bowel sounds  EXTREMITIES:  2+ peripheral pulses b/l, No clubbing, cyanosis, or edema  NEUROLOGY: A&O x 3, no focal deficits  SKIN: No rashes or lesions  Psych: Affect appropriate,  no suicidal ideation.  Insight: Poor  Judgement: Poor      LABS:                      RADIOLOGY & ADDITIONAL TESTS:  Lab Results Reviewed   Imaging Reviewed  Electrocardiogram Reviewed

## 2022-12-29 NOTE — BH CONSULTATION LIAISON PROGRESS NOTE - NSBHFUPINTERVALCCFT_PSY_A_CORE
"I feel a little better."
"I'm a little better."
"I am very anxious to get out of here."
I still feel weak
"I feel okay."
"I'm feeling very anxious."

## 2022-12-29 NOTE — BH CONSULTATION LIAISON PROGRESS NOTE - NSBHATTESTBILLINGAW_PSY_A_CORE
[id number] Inpatient care - moderate complexity - 25 minutes
Billing in another system
Billing in another system
[id number] Inpatient care - moderate complexity - 25 minutes
[id number] Inpatient care - moderate complexity - 25 minutes
Billing in another system

## 2022-12-29 NOTE — PROGRESS NOTE ADULT - ASSESSMENT
42M c hx anxiety, depression, SI (Sept '21), pw suspected SI and SIRS
42M c hx anxiety, depression, SI (Sept '21), pw suspected SI and SIRS. Found to +sapovirus on GI PCR, likely SIRS from acute gastroenteritis. CT head negative. 
42M c hx anxiety, depression, SI (Sept '21), pw suspected SI and SIRS
42M c hx anxiety, depression, SI (Sept '21), pw suspected SI and SIRS. Found to +sapovirus on GI PCR, likely SIRS from acute gastroenteritis. CT head negative. Pending inpatient psych placement.

## 2022-12-29 NOTE — PROGRESS NOTE ADULT - ATTENDING COMMENTS
Patient seen and examined at bedside. No acute events overnight. Patient denies SI, but his history and affect is concerning. CT head without anatomical explanation for presentation. Plan to 2PC to psych unit.
Patient seen and examined at bedside. No acute events overnight. Feeling okay and denies SI. Regardless, psychiatry thinks it's best to be placed in inpatient psych for further monitoring and medication management. 2PC form filled out and patient with accepting facility.    Discharge Time: 40 minutes
Patient seen and examined at bedside. No acute events overnight. Patient is medically cleared for discharge to inpatient psych. Patient and his wife seems okay with it. No other complaints. SW involved in placement.
SECOND TOUCH    Patient feeling okay and for what it's worth denies SI. Psych adjusting medications. Unknown reason for fever, but work up negative and cultures pending. Monitor off anti-microbials. Pending psych recommendations for disposition.
Patient seen and examined at bedside. No acute events overnight. Denies SI. No fever. Medically optimized. Psych f/u for disposition. Maintain COVID surveillance.
Patient seen and examined at bedside. No acute events overnight. Diarrhea resolved and likely 2/2 to sapovirus which is likely cause of fever. BCx NGTD and rest of infectious work up negative. Psych recommending further adjustment of medications & disposition still to be determined.

## 2022-12-29 NOTE — BH CONSULTATION LIAISON PROGRESS NOTE - CURRENT MEDICATION
MEDICATIONS  (STANDING):  chlorhexidine 4% Liquid 1 Application(s) Topical daily  clonazePAM  Tablet 1 milliGRAM(s) Oral three times a day  enoxaparin Injectable 40 milliGRAM(s) SubCutaneous every 24 hours  melatonin 3 milliGRAM(s) Oral at bedtime  OLANZapine 5 milliGRAM(s) Oral at bedtime  venlafaxine XR. 150 milliGRAM(s) Oral daily    MEDICATIONS  (PRN):  acetaminophen     Tablet .. 650 milliGRAM(s) Oral every 6 hours PRN Temp greater or equal to 38C (100.4F), Moderate Pain (4 - 6), Severe Pain (7 - 10)  zolpidem 5 milliGRAM(s) Oral at bedtime PRN Insomnia  zolpidem 5 milliGRAM(s) Oral at bedtime PRN Insomnia  
MEDICATIONS  (STANDING):  chlorhexidine 4% Liquid 1 Application(s) Topical daily  OLANZapine 5 milliGRAM(s) Oral two times a day  venlafaxine XR. 75 milliGRAM(s) Oral daily    MEDICATIONS  (PRN):  clonazePAM  Tablet 1 milliGRAM(s) Oral three times a day PRN anxiety  zolpidem 5 milliGRAM(s) Oral at bedtime PRN Insomnia  zolpidem 5 milliGRAM(s) Oral at bedtime PRN Insomnia  
MEDICATIONS  (STANDING):  chlorhexidine 4% Liquid 1 Application(s) Topical daily  clonazePAM  Tablet 1 milliGRAM(s) Oral three times a day  melatonin 3 milliGRAM(s) Oral at bedtime  OLANZapine 5 milliGRAM(s) Oral at bedtime  venlafaxine XR. 150 milliGRAM(s) Oral daily    MEDICATIONS  (PRN):  acetaminophen     Tablet .. 650 milliGRAM(s) Oral every 6 hours PRN Temp greater or equal to 38C (100.4F), Moderate Pain (4 - 6), Severe Pain (7 - 10)  zolpidem 5 milliGRAM(s) Oral at bedtime PRN Insomnia  zolpidem 5 milliGRAM(s) Oral at bedtime PRN Insomnia  
MEDICATIONS  (STANDING):  chlorhexidine 4% Liquid 1 Application(s) Topical daily  clonazePAM  Tablet 1 milliGRAM(s) Oral three times a day  enoxaparin Injectable 40 milliGRAM(s) SubCutaneous every 24 hours  melatonin 3 milliGRAM(s) Oral at bedtime  OLANZapine 5 milliGRAM(s) Oral at bedtime  venlafaxine XR. 112.5 milliGRAM(s) Oral daily    MEDICATIONS  (PRN):  acetaminophen     Tablet .. 650 milliGRAM(s) Oral every 6 hours PRN Temp greater or equal to 38C (100.4F), Moderate Pain (4 - 6), Severe Pain (7 - 10)  zolpidem 5 milliGRAM(s) Oral at bedtime PRN Insomnia  zolpidem 5 milliGRAM(s) Oral at bedtime PRN Insomnia  
MEDICATIONS  (STANDING):  chlorhexidine 4% Liquid 1 Application(s) Topical daily  clonazePAM  Tablet 1 milliGRAM(s) Oral three times a day  melatonin 3 milliGRAM(s) Oral at bedtime  OLANZapine 5 milliGRAM(s) Oral at bedtime  venlafaxine XR. 150 milliGRAM(s) Oral daily    MEDICATIONS  (PRN):  acetaminophen     Tablet .. 650 milliGRAM(s) Oral every 6 hours PRN Temp greater or equal to 38C (100.4F), Moderate Pain (4 - 6), Severe Pain (7 - 10)  zolpidem 5 milliGRAM(s) Oral at bedtime PRN Insomnia  zolpidem 5 milliGRAM(s) Oral at bedtime PRN Insomnia  
MEDICATIONS  (STANDING):  chlorhexidine 4% Liquid 1 Application(s) Topical daily  enoxaparin Injectable 40 milliGRAM(s) SubCutaneous every 24 hours  melatonin 3 milliGRAM(s) Oral at bedtime  OLANZapine 5 milliGRAM(s) Oral at bedtime  venlafaxine XR. 112.5 milliGRAM(s) Oral daily    MEDICATIONS  (PRN):  acetaminophen     Tablet .. 650 milliGRAM(s) Oral every 6 hours PRN Temp greater or equal to 38C (100.4F), Moderate Pain (4 - 6), Severe Pain (7 - 10)  clonazePAM  Tablet 1 milliGRAM(s) Oral three times a day PRN anxiety  zolpidem 5 milliGRAM(s) Oral at bedtime PRN Insomnia  zolpidem 5 milliGRAM(s) Oral at bedtime PRN Insomnia

## 2022-12-29 NOTE — BH CONSULTATION LIAISON PROGRESS NOTE - NSBHATTESTCOMMENTATTENDFT_PSY_A_CORE
Pt is a 41 y/o ,  male, currently domiciled with his wife, 2 children, and mother-in-law, employed as an . Pt was previously hospitalized at [facility] in September 2021 for a SA, currently seeing a psychiatrist, Dr. Telly De La Paz, on Venlafaxine 75mg, Clonazepam 1mg, Olanzapine, and Lunesta 3mg, diagnosed with MDD. No PMHx, no forensic hx, no substance use disorder hx. BIB EMS for potential suicide attempt. Per chart, the police called EMS after pt was seen by passerby looking [place] and became concerned for his safety. Psychiatry consulted for SI.   Patient continues to be an unsafe discharge. He is denying current SI/HI/AVH, but his affect and prior actions along with wife's provided history is concerning for pt's safety. Plan is to transfer to inpatient psychiatry. 
This is a 42-y.o. HM patient, , currently domiciled with his wife, 2 children, and mother-in-law, employed as an , brought in by [facility] after found at [facility], contemplating suicide. Consult requested to evaluate for suicidality.    I have seen and evaluated this patient myself. Chart, labs, meds reviewed. I agree with trainee's assessment and plan. Patient remains depressed and warrants inpatient psychiatric admission. Spoke to Dr. Bhagat from [facility], regarding the medical aspects of this admission.    
This is a 42-y.o. HM patient, , currently domiciled with his wife, 2 children, and mother-in-law, employed as an , brought in by [facility] after found at [facility], contemplating suicide. Consult requested to evaluate for suicidality.    Pt was previously hospitalized at [facility] in September 2021 for a SA, currently seeing a psychiatrist, Dr. Telly De La Paz [phone], on Venlafaxine 75mg, Clonazepam 1mg, Olanzapine, and Lunesta 3mg, diagnosed with MDD. No PMHx, no forensic hx, no substance use disorder hx. Patient remains depressed and overall unwell with elevated HR, at present time, he is an unsafe discharge. Spoke to Dr. De La Paz, who agrees with the plan for an inpatient admission.

## 2022-12-29 NOTE — BH CONSULTATION LIAISON PROGRESS NOTE - NSBHFUPREASONCONS_PSY_A_CORE
suicidality/depression
suicidality
suicidality/depression
suicidality/depression

## 2022-12-29 NOTE — DISCHARGE NOTE NURSING/CASE MANAGEMENT/SOCIAL WORK - NSDCPEFALRISK_GEN_ALL_CORE
For information on Fall & Injury Prevention, visit: [email or web address] OR  [email or web address] OR  https://www.cdc.gov/steadi/patient.html

## 2022-12-29 NOTE — BH CONSULTATION LIAISON PROGRESS NOTE - NSBHCHARTREVIEWVS_PSY_A_CORE FT
Vital Signs Last 24 Hrs  T(C): 36.7 (25 Dec 2022 12:01), Max: 37.4 (24 Dec 2022 16:40)  T(F): 98.1 (25 Dec 2022 12:01), Max: 99.3 (24 Dec 2022 16:40)  HR: 113 (25 Dec 2022 12:01) (103 - 113)  BP: 134/98 (25 Dec 2022 12:01) (128/86 - 135/91)  BP(mean): --  RR: 18 (25 Dec 2022 12:01) (18 - 18)  SpO2: 98% (25 Dec 2022 12:01) (97% - 98%)    Parameters below as of 25 Dec 2022 12:01  Patient On (Oxygen Delivery Method): room air    
Vital Signs Last 24 Hrs  T(C): 36.8 (29 Dec 2022 11:56), Max: 37.2 (28 Dec 2022 21:46)  T(F): 98.2 (29 Dec 2022 11:56), Max: 98.9 (28 Dec 2022 21:46)  HR: 109 (29 Dec 2022 11:56) (92 - 109)  BP: 129/77 (29 Dec 2022 11:56) (113/75 - 129/77)  BP(mean): --  RR: 19 (29 Dec 2022 11:56) (16 - 19)  SpO2: 96% (29 Dec 2022 11:56) (96% - 99%)    Parameters below as of 29 Dec 2022 11:56  Patient On (Oxygen Delivery Method): room air    
Vital Signs Last 24 Hrs  T(C): 37.4 (24 Dec 2022 05:16), Max: 38.5 (23 Dec 2022 14:40)  T(F): 99.3 (24 Dec 2022 05:16), Max: 101.3 (23 Dec 2022 14:40)  HR: 96 (24 Dec 2022 05:16) (91 - 136)  BP: 124/82 (24 Dec 2022 05:16) (118/81 - 142/97)  BP(mean): --  RR: 18 (24 Dec 2022 05:16) (16 - 20)  SpO2: 97% (24 Dec 2022 05:16) (96% - 100%)    Parameters below as of 24 Dec 2022 05:16  Patient On (Oxygen Delivery Method): room air    
Vital Signs Last 24 Hrs  T(C): 36.7 (28 Dec 2022 12:49), Max: 37.1 (27 Dec 2022 21:32)  T(F): 98 (28 Dec 2022 12:49), Max: 98.8 (27 Dec 2022 21:32)  HR: 110 (28 Dec 2022 12:49) (78 - 110)  BP: 117/72 (28 Dec 2022 12:49) (101/65 - 118/83)  BP(mean): --  RR: 18 (28 Dec 2022 12:49) (16 - 18)  SpO2: 96% (28 Dec 2022 12:49) (96% - 98%)    Parameters below as of 28 Dec 2022 12:49  Patient On (Oxygen Delivery Method): room air    
Vital Signs Last 24 Hrs  T(C): 37.1 (27 Dec 2022 13:02), Max: 37.1 (27 Dec 2022 13:02)  T(F): 98.8 (27 Dec 2022 13:02), Max: 98.8 (27 Dec 2022 13:02)  HR: 114 (27 Dec 2022 13:02) (106 - 125)  BP: 138/85 (27 Dec 2022 13:02) (120/80 - 138/85)  BP(mean): --  RR: 18 (27 Dec 2022 13:02) (16 - 18)  SpO2: 97% (27 Dec 2022 13:02) (96% - 97%)    Parameters below as of 27 Dec 2022 13:02  Patient On (Oxygen Delivery Method): room air    
Vital Signs Last 24 Hrs  T(C): 37.1 (26 Dec 2022 12:47), Max: 37.1 (26 Dec 2022 12:47)  T(F): 98.7 (26 Dec 2022 12:47), Max: 98.7 (26 Dec 2022 12:47)  HR: 101 (26 Dec 2022 12:47) (83 - 101)  BP: 126/84 (26 Dec 2022 12:47) (109/73 - 126/84)  BP(mean): --  RR: 18 (26 Dec 2022 12:47) (18 - 18)  SpO2: 98% (26 Dec 2022 12:47) (96% - 98%)    Parameters below as of 26 Dec 2022 12:47  Patient On (Oxygen Delivery Method): room air

## 2022-12-29 NOTE — BH CONSULTATION LIAISON PROGRESS NOTE - NSBHFUPINTERVALHXFT_PSY_A_CORE
Pt assessed at bedside with wife present. Pt continues to endorse anxiety about his discharge to a psychiatric facility. He states he would prefer to be treated outpatient. Pt endorses adequate appetite, but says he did not sleep well last night. Denies medication SE, denies current SIIP/HIIP/AVH.

## 2022-12-29 NOTE — BH CONSULTATION LIAISON PROGRESS NOTE - NSBHPTASSESSDT_PSY_A_CORE
28-Dec-2022 15:51
25-Dec-2022 13:19
29-Dec-2022 13:16
24-Dec-2022 10:36
27-Dec-2022 15:33
26-Dec-2022 16:11

## 2022-12-29 NOTE — PROGRESS NOTE ADULT - REASON FOR ADMISSION
[place]ing [place] bridge

## 2023-01-13 ENCOUNTER — OUTPATIENT (OUTPATIENT)
Dept: OUTPATIENT SERVICES | Facility: HOSPITAL | Age: 43
LOS: 1 days | Discharge: STILL ADMITTED OR ADMINISTRATIVE | End: 2023-01-13

## 2023-02-21 ENCOUNTER — APPOINTMENT (OUTPATIENT)
Dept: OPHTHALMOLOGY | Facility: CLINIC | Age: 43
End: 2023-02-21
Payer: COMMERCIAL

## 2023-02-21 ENCOUNTER — NON-APPOINTMENT (OUTPATIENT)
Age: 43
End: 2023-02-21

## 2023-02-21 PROCEDURE — 92015 DETERMINE REFRACTIVE STATE: CPT

## 2023-02-21 PROCEDURE — 92004 COMPRE OPH EXAM NEW PT 1/>: CPT

## 2023-08-23 NOTE — PROGRESS NOTE ADULT - PROBLEM SELECTOR PLAN 2
- f/u blood cultures  - no localizing s/s  - monitor off abx febrile to 101.3, tachy to 150  - f/u blood cultures  - no localizing s/s  - monitor off abx  - chest xray negative, rvp negative, ua negative.   - No rash, no abscess noted. Rituxan Counseling:  I discussed with the patient the risks of Rituxan infusions. Side effects can include infusion reactions, severe drug rashes including mucocutaneous reactions, reactivation of latent hepatitis and other infections and rarely progressive multifocal leukoencephalopathy.  All of the patient's questions and concerns were addressed.

## 2023-11-02 RX ORDER — ESZOPICLONE 2 MG/1
1 TABLET, COATED ORAL
Qty: 0 | Refills: 0 | DISCHARGE

## 2023-11-02 RX ORDER — CLONAZEPAM 1 MG
1 TABLET ORAL
Qty: 0 | Refills: 0 | DISCHARGE

## 2023-11-02 RX ORDER — VENLAFAXINE HCL 75 MG
1 CAPSULE, EXT RELEASE 24 HR ORAL
Qty: 0 | Refills: 0 | DISCHARGE

## 2023-11-02 RX ORDER — OLANZAPINE 15 MG/1
1 TABLET, FILM COATED ORAL
Qty: 0 | Refills: 0 | DISCHARGE

## 2023-12-08 ENCOUNTER — APPOINTMENT (OUTPATIENT)
Dept: GASTROENTEROLOGY | Facility: CLINIC | Age: 43
End: 2023-12-08

## 2023-12-14 NOTE — ED BEHAVIORAL HEALTH ASSESSMENT NOTE - NSBHMSEMUSCLE_PSY_A_CORE
Spoke to pt. Informed of biopsy result. Verbalized understanding.   
----- Message from Mira Mckenzie sent at 12/14/2023  9:01 AM CST -----  Regarding: Patient Returning Call  Contact: patient at [phone]  Type:  Patient Returning Call    Who Called:  patient at [phone]    Who Left Message for Patient:  unsure  Does the patient know what this is regarding?:  yes, results    Additional Information:  Please call and advise. Thank you        
Normal muscle tone/strength

## 2024-05-03 PROBLEM — T14.91XA SUICIDE ATTEMPT, INITIAL ENCOUNTER: Chronic | Status: ACTIVE | Noted: 2022-12-23

## 2024-05-03 PROBLEM — Z86.59 PERSONAL HISTORY OF OTHER MENTAL AND BEHAVIORAL DISORDERS: Chronic | Status: ACTIVE | Noted: 2022-12-23

## 2024-05-03 PROBLEM — F41.9 ANXIETY DISORDER, UNSPECIFIED: Chronic | Status: ACTIVE | Noted: 2022-12-23

## 2024-07-17 ENCOUNTER — APPOINTMENT (OUTPATIENT)
Dept: GASTROENTEROLOGY | Facility: CLINIC | Age: 44
End: 2024-07-17